# Patient Record
Sex: MALE | Race: BLACK OR AFRICAN AMERICAN | NOT HISPANIC OR LATINO | URBAN - METROPOLITAN AREA
[De-identification: names, ages, dates, MRNs, and addresses within clinical notes are randomized per-mention and may not be internally consistent; named-entity substitution may affect disease eponyms.]

---

## 2017-01-31 ENCOUNTER — INPATIENT (INPATIENT)
Facility: HOSPITAL | Age: 77
LOS: 5 days | Discharge: HOME HEALTH | End: 2017-02-06
Payer: MEDICARE

## 2017-01-31 VITALS
TEMPERATURE: 98 F | OXYGEN SATURATION: 98 % | RESPIRATION RATE: 20 BRPM | HEART RATE: 110 BPM | DIASTOLIC BLOOD PRESSURE: 77 MMHG | SYSTOLIC BLOOD PRESSURE: 123 MMHG

## 2017-01-31 LAB
BASOPHILS # BLD AUTO: 0.02 K/UL — SIGNIFICANT CHANGE UP (ref 0–0.2)
BASOPHILS NFR BLD AUTO: 0.2 % — SIGNIFICANT CHANGE UP (ref 0–2)
EOSINOPHIL # BLD AUTO: 0.08 K/UL — SIGNIFICANT CHANGE UP (ref 0–0.5)
EOSINOPHIL NFR BLD AUTO: 0.8 % — SIGNIFICANT CHANGE UP (ref 0–6)
HCT VFR BLD CALC: 37.4 % — LOW (ref 39–50)
HGB BLD-MCNC: 12.1 G/DL — LOW (ref 13–17)
IMM GRANULOCYTES NFR BLD AUTO: 0.5 % — SIGNIFICANT CHANGE UP (ref 0–1.5)
LYMPHOCYTES # BLD AUTO: 1.25 K/UL — SIGNIFICANT CHANGE UP (ref 1–3.3)
LYMPHOCYTES # BLD AUTO: 12.6 % — LOW (ref 13–44)
MCHC RBC-ENTMCNC: 27 PG — SIGNIFICANT CHANGE UP (ref 27–34)
MCHC RBC-ENTMCNC: 32.4 % — SIGNIFICANT CHANGE UP (ref 32–36)
MCV RBC AUTO: 83.5 FL — SIGNIFICANT CHANGE UP (ref 80–100)
MONOCYTES # BLD AUTO: 0.62 K/UL — SIGNIFICANT CHANGE UP (ref 0–0.9)
MONOCYTES NFR BLD AUTO: 6.2 % — SIGNIFICANT CHANGE UP (ref 2–14)
NEUTROPHILS # BLD AUTO: 7.93 K/UL — HIGH (ref 1.8–7.4)
NEUTROPHILS NFR BLD AUTO: 79.7 % — HIGH (ref 43–77)
PLATELET # BLD AUTO: 153 K/UL — SIGNIFICANT CHANGE UP (ref 150–400)
PMV BLD: 11.2 FL — SIGNIFICANT CHANGE UP (ref 7–13)
RBC # BLD: 4.48 M/UL — SIGNIFICANT CHANGE UP (ref 4.2–5.8)
RBC # FLD: 15.3 % — HIGH (ref 10.3–14.5)
WBC # BLD: 9.95 K/UL — SIGNIFICANT CHANGE UP (ref 3.8–10.5)
WBC # FLD AUTO: 9.95 K/UL — SIGNIFICANT CHANGE UP (ref 3.8–10.5)

## 2017-01-31 NOTE — ED ADULT TRIAGE NOTE - CHIEF COMPLAINT QUOTE
pt. BIBA from home, as per wife pt. was sitting in a chair, leaned back and his "eye rolled into the back of his head", pt. recalls feeling weak, dizzy and nauseous, has felt this way for about 2 weeks.  O2 sat 86% on RA upon EMS arrival, placed on non-rebreather, presently 98% of RA.  PMHx HTN, reyes catheter x 1 month.  . pt. BIBA from home, as per wife pt. was sitting in a chair, leaned back and his "eye rolled into the back of his head", pt. recalls feeling weak, dizzy and nauseous, has felt this way for about 2 weeks.  O2 sat 86% on RA upon EMS arrival, placed on non-rebreather, presently 98% of RA.  Denies CP/SOB at the present time.  PMHx HTN, reyes catheter x 1 month.  . pt. BIBA from home, as per wife pt. was sitting in a chair, leaned back and his "eyes rolled into the back of his head", pt. recalls feeling weak, dizzy and nauseous, has felt this way for about 2 weeks.  O2 sat 86% on RA upon EMS arrival, placed on non-rebreather, presently 98% on RA.  Denies CP/SOB at the present time.  PMHx HTN, reyes catheter x 1 month.  .

## 2017-01-31 NOTE — ED ADULT NURSE NOTE - OBJECTIVE STATEMENT
Received pt to spot 7 A&Ox4 reports one syncopal episode tonight at home while watching TV associated with generalized weakness and dizziness. Pt denies any CP or SOB, denies N/V associated. Pt has reyes leg bag in place r/t bladder weakness, placed 3 weeks ago. Pt reports hx of HTN only, O2 saturation on RA as documented, supplemental O2 applied with relief. EKG in progress, IV placed, labs sent, VS noted, MD at bedside, will reassess.

## 2017-01-31 NOTE — ED PROVIDER NOTE - CARE PLAN
Principal Discharge DX:	Sickle cell anemia with pain  Instructions for follow-up, activity and diet:	pls rest, drink plenty of fluids, continue taking your medications, f/u with your pmd, return for any worsening symptoms or any other concerning symptoms Principal Discharge DX:	Other acute pulmonary embolism with acute cor pulmonale

## 2017-01-31 NOTE — ED PROVIDER NOTE - PLAN OF CARE
pls rest, drink plenty of fluids, continue taking your medications, f/u with your pmd, return for any worsening symptoms or any other concerning symptoms

## 2017-01-31 NOTE — ED PROVIDER NOTE - PROGRESS NOTE DETAILS
MIKE BROWNING: Pt reevaluated at bedside, states that shes feeling a lot better, will be d/c and f/u with pmd ED Attending: Rey  Pt signed out to me from Dr. Parks to f/u and reassess.  Once pt condition reviewed we obtained CTPA which showed large BL PE's.  Given evidence of right heart strain on CT, as well as labs and persistent hypoxia we obtained MICU eval for tPA.  Recommendation from MICU to d/w CTSx as well for possible xfer to [facility] since no ICU beds at [facility] at present time and may be surgical candidate vs CTICU. I reviewed case by telephone with Dr. Spencer (CTSx) who stated that surgical intervention unlikely given extent of clot burden.  per [facility] MICU also no beds available at [facility].  We will keep pt at this hospital, administer tPA. Per MICU attending, policy guidance available for tPA in setting of Heparin gt and we are awaiting specific dosing/rate reccs.  Pt grossly unchanged, stil mildly tachypneic and becomes hypoxic with minimal exertion even while on 4 LPM NC. Rey: D/w MICU attending - PTT still pending and will likely need to hold Heparin gtt prior to tPA.  Pt admitted to MICU and will be boarding in CTICU.  Further Tx per MICU team who recommend continuing Heparin for now.

## 2017-01-31 NOTE — ED ADULT NURSE NOTE - CHIEF COMPLAINT QUOTE
pt. BIBA from home, as per wife pt. was sitting in a chair, leaned back and his "eye rolled into the back of his head", pt. recalls feeling weak, dizzy and nauseous, has felt this way for about 2 weeks.  O2 sat 86% on RA upon EMS arrival, placed on non-rebreather, presently 98% of RA.  Denies CP/SOB at the present time.  PMHx HTN, reyes catheter x 1 month.  .

## 2017-01-31 NOTE — ED PROVIDER NOTE - OBJECTIVE STATEMENT
75 y/o male pmh htn, indwelling reyes,  not on any blood thinners, no cardiologist presents s/p syncopal episode wife states that for few days he has been feeling weak, had few presyncopal episodes, however today, he was sitting in bed, wife walked in and saw his eyes roll back, and was unresponsive for 1 minute, called ems, o2 sat was 85% on RA, brought to ER. pt currently c/o weakness, denies any headache, neck pain, f/c/n/v/d, chest pain, sob, abdominal pain, urinary symptoms, numbnes/weakness/tingling, recent travel, sick contact, social history. Pt placed on canula 2l

## 2017-02-01 DIAGNOSIS — N40.0 BENIGN PROSTATIC HYPERPLASIA WITHOUT LOWER URINARY TRACT SYMPTOMS: ICD-10-CM

## 2017-02-01 DIAGNOSIS — D57.00 HB-SS DISEASE WITH CRISIS, UNSPECIFIED: ICD-10-CM

## 2017-02-01 DIAGNOSIS — I10 ESSENTIAL (PRIMARY) HYPERTENSION: ICD-10-CM

## 2017-02-01 DIAGNOSIS — I26.09 OTHER PULMONARY EMBOLISM WITH ACUTE COR PULMONALE: ICD-10-CM

## 2017-02-01 DIAGNOSIS — Z41.8 ENCOUNTER FOR OTHER PROCEDURES FOR PURPOSES OTHER THAN REMEDYING HEALTH STATE: ICD-10-CM

## 2017-02-01 DIAGNOSIS — I82.409 ACUTE EMBOLISM AND THROMBOSIS OF UNSPECIFIED DEEP VEINS OF UNSPECIFIED LOWER EXTREMITY: ICD-10-CM

## 2017-02-01 DIAGNOSIS — C61 MALIGNANT NEOPLASM OF PROSTATE: ICD-10-CM

## 2017-02-01 DIAGNOSIS — R55 SYNCOPE AND COLLAPSE: ICD-10-CM

## 2017-02-01 DIAGNOSIS — I26.99 OTHER PULMONARY EMBOLISM WITHOUT ACUTE COR PULMONALE: ICD-10-CM

## 2017-02-01 LAB
ALBUMIN SERPL ELPH-MCNC: 3.6 G/DL — SIGNIFICANT CHANGE UP (ref 3.3–5)
ALP SERPL-CCNC: 75 U/L — SIGNIFICANT CHANGE UP (ref 40–120)
ALT FLD-CCNC: 47 U/L — HIGH (ref 4–41)
APTT BLD: 185.4 SEC — CRITICAL HIGH (ref 27.5–37.4)
APTT BLD: 24 SEC — LOW (ref 27.5–37.4)
APTT BLD: 81.9 SEC — HIGH (ref 27.5–37.4)
APTT BLD: 91.3 SEC — HIGH (ref 27.5–37.4)
AST SERPL-CCNC: 42 U/L — HIGH (ref 4–40)
BASE EXCESS BLDV CALC-SCNC: -0.4 MMOL/L — SIGNIFICANT CHANGE UP
BASE EXCESS BLDV CALC-SCNC: -0.9 MMOL/L — SIGNIFICANT CHANGE UP
BILIRUB SERPL-MCNC: 0.9 MG/DL — SIGNIFICANT CHANGE UP (ref 0.2–1.2)
BLOOD GAS VENOUS - CREATININE: 1.27 MG/DL — SIGNIFICANT CHANGE UP (ref 0.5–1.3)
BLOOD GAS VENOUS - CREATININE: SIGNIFICANT CHANGE UP MG/DL (ref 0.5–1.3)
BUN SERPL-MCNC: 22 MG/DL — SIGNIFICANT CHANGE UP (ref 7–23)
CALCIUM SERPL-MCNC: 9.4 MG/DL — SIGNIFICANT CHANGE UP (ref 8.4–10.5)
CHLORIDE BLDV-SCNC: 108 MMOL/L — SIGNIFICANT CHANGE UP (ref 96–108)
CHLORIDE BLDV-SCNC: 109 MMOL/L — HIGH (ref 96–108)
CHLORIDE SERPL-SCNC: 102 MMOL/L — SIGNIFICANT CHANGE UP (ref 98–107)
CK MB BLD-MCNC: 3.61 NG/ML — SIGNIFICANT CHANGE UP (ref 1–6.6)
CK SERPL-CCNC: 89 U/L — SIGNIFICANT CHANGE UP (ref 30–200)
CO2 SERPL-SCNC: 18 MMOL/L — LOW (ref 22–31)
CREAT SERPL-MCNC: 1.41 MG/DL — HIGH (ref 0.5–1.3)
D DIMER BLD IA.RAPID-MCNC: 9205 NG/ML — SIGNIFICANT CHANGE UP
GAS PNL BLDV: 139 MMOL/L — SIGNIFICANT CHANGE UP (ref 136–146)
GAS PNL BLDV: 141 MMOL/L — SIGNIFICANT CHANGE UP (ref 136–146)
GLUCOSE BLDV-MCNC: 119 — HIGH (ref 70–99)
GLUCOSE BLDV-MCNC: 132 — HIGH (ref 70–99)
GLUCOSE SERPL-MCNC: 156 MG/DL — HIGH (ref 70–99)
HCO3 BLDV-SCNC: 22 MMOL/L — SIGNIFICANT CHANGE UP (ref 20–27)
HCO3 BLDV-SCNC: 24 MMOL/L — SIGNIFICANT CHANGE UP (ref 20–27)
HCT VFR BLD CALC: 33.5 % — LOW (ref 39–50)
HCT VFR BLDV CALC: 37.3 % — LOW (ref 39–51)
HCT VFR BLDV CALC: 41.4 % — SIGNIFICANT CHANGE UP (ref 39–51)
HGB BLD-MCNC: 11.1 G/DL — LOW (ref 13–17)
HGB BLDV-MCNC: 12.1 G/DL — LOW (ref 13–17)
HGB BLDV-MCNC: 13.5 G/DL — SIGNIFICANT CHANGE UP (ref 13–17)
INR BLD: 1.11 — SIGNIFICANT CHANGE UP (ref 0.87–1.18)
INR BLD: 1.19 — HIGH (ref 0.87–1.18)
LACTATE BLDV-MCNC: 2 MMOL/L — SIGNIFICANT CHANGE UP (ref 0.5–2)
LACTATE BLDV-MCNC: 2.5 MMOL/L — HIGH (ref 0.5–2)
MCHC RBC-ENTMCNC: 27.1 PG — SIGNIFICANT CHANGE UP (ref 27–34)
MCHC RBC-ENTMCNC: 33.1 % — SIGNIFICANT CHANGE UP (ref 32–36)
MCV RBC AUTO: 81.7 FL — SIGNIFICANT CHANGE UP (ref 80–100)
NT-PROBNP SERPL-SCNC: 2545 PG/ML — SIGNIFICANT CHANGE UP
NT-PROBNP SERPL-SCNC: 3070 PG/ML — SIGNIFICANT CHANGE UP
PCO2 BLDV: 41 MMHG — SIGNIFICANT CHANGE UP (ref 41–51)
PCO2 BLDV: 43 MMHG — SIGNIFICANT CHANGE UP (ref 41–51)
PH BLDV: 7.36 PH — SIGNIFICANT CHANGE UP (ref 7.32–7.43)
PH BLDV: 7.39 PH — SIGNIFICANT CHANGE UP (ref 7.32–7.43)
PLATELET # BLD AUTO: 137 K/UL — LOW (ref 150–400)
PMV BLD: 10.1 FL — SIGNIFICANT CHANGE UP (ref 7–13)
PO2 BLDV: 39 MMHG — SIGNIFICANT CHANGE UP (ref 35–40)
PO2 BLDV: < 24 MMHG — LOW (ref 35–40)
POTASSIUM BLDV-SCNC: 4.2 MMOL/L — SIGNIFICANT CHANGE UP (ref 3.4–4.5)
POTASSIUM BLDV-SCNC: 5 MMOL/L — HIGH (ref 3.4–4.5)
POTASSIUM SERPL-MCNC: 5 MMOL/L — SIGNIFICANT CHANGE UP (ref 3.5–5.3)
POTASSIUM SERPL-SCNC: 5 MMOL/L — SIGNIFICANT CHANGE UP (ref 3.5–5.3)
PROT SERPL-MCNC: 8 G/DL — SIGNIFICANT CHANGE UP (ref 6–8.3)
PROTHROM AB SERPL-ACNC: 12.7 SEC — SIGNIFICANT CHANGE UP (ref 10–13.1)
PROTHROM AB SERPL-ACNC: 13.6 SEC — HIGH (ref 10–13.1)
RBC # BLD: 4.1 M/UL — LOW (ref 4.2–5.8)
RBC # FLD: 15.2 % — HIGH (ref 10.3–14.5)
SAO2 % BLDV: 17.4 % — LOW (ref 60–85)
SAO2 % BLDV: 68.3 % — SIGNIFICANT CHANGE UP (ref 60–85)
SODIUM SERPL-SCNC: 142 MMOL/L — SIGNIFICANT CHANGE UP (ref 135–145)
TROPONIN T SERPL-MCNC: 0.08 NG/ML — HIGH (ref 0–0.06)
TROPONIN T SERPL-MCNC: 0.09 NG/ML — HIGH (ref 0–0.06)
WBC # BLD: 6.94 K/UL — SIGNIFICANT CHANGE UP (ref 3.8–10.5)
WBC # FLD AUTO: 6.94 K/UL — SIGNIFICANT CHANGE UP (ref 3.8–10.5)

## 2017-02-01 PROCEDURE — 71275 CT ANGIOGRAPHY CHEST: CPT | Mod: 26

## 2017-02-01 PROCEDURE — 70450 CT HEAD/BRAIN W/O DYE: CPT | Mod: 26

## 2017-02-01 PROCEDURE — 71010: CPT | Mod: 26

## 2017-02-01 RX ORDER — SODIUM CHLORIDE 9 MG/ML
1000 INJECTION INTRAMUSCULAR; INTRAVENOUS; SUBCUTANEOUS ONCE
Qty: 0 | Refills: 0 | Status: COMPLETED | OUTPATIENT
Start: 2017-02-01 | End: 2017-02-01

## 2017-02-01 RX ORDER — HEPARIN SODIUM 5000 [USP'U]/ML
7000 INJECTION INTRAVENOUS; SUBCUTANEOUS ONCE
Qty: 0 | Refills: 0 | Status: COMPLETED | OUTPATIENT
Start: 2017-02-01 | End: 2017-02-01

## 2017-02-01 RX ORDER — ALTEPLASE 100 MG
10 KIT INTRAVENOUS ONCE
Qty: 0 | Refills: 0 | Status: COMPLETED | OUTPATIENT
Start: 2017-02-01 | End: 2017-02-01

## 2017-02-01 RX ORDER — ASPIRIN/CALCIUM CARB/MAGNESIUM 324 MG
325 TABLET ORAL ONCE
Qty: 0 | Refills: 0 | Status: COMPLETED | OUTPATIENT
Start: 2017-02-01 | End: 2017-02-01

## 2017-02-01 RX ORDER — HEPARIN SODIUM 5000 [USP'U]/ML
800 INJECTION INTRAVENOUS; SUBCUTANEOUS
Qty: 25000 | Refills: 0 | Status: DISCONTINUED | OUTPATIENT
Start: 2017-02-01 | End: 2017-02-02

## 2017-02-01 RX ORDER — HEPARIN SODIUM 5000 [USP'U]/ML
INJECTION INTRAVENOUS; SUBCUTANEOUS
Qty: 25000 | Refills: 0 | Status: DISCONTINUED | OUTPATIENT
Start: 2017-02-01 | End: 2017-02-01

## 2017-02-01 RX ORDER — ALTEPLASE 100 MG
40 KIT INTRAVENOUS ONCE
Qty: 0 | Refills: 0 | Status: COMPLETED | OUTPATIENT
Start: 2017-02-01 | End: 2017-02-01

## 2017-02-01 RX ORDER — INFLUENZA VIRUS VACCINE 15; 15; 15; 15 UG/.5ML; UG/.5ML; UG/.5ML; UG/.5ML
0.5 SUSPENSION INTRAMUSCULAR ONCE
Qty: 0 | Refills: 0 | Status: DISCONTINUED | OUTPATIENT
Start: 2017-02-01 | End: 2017-02-06

## 2017-02-01 RX ORDER — HEPARIN SODIUM 5000 [USP'U]/ML
7000 INJECTION INTRAVENOUS; SUBCUTANEOUS EVERY 6 HOURS
Qty: 0 | Refills: 0 | Status: DISCONTINUED | OUTPATIENT
Start: 2017-02-01 | End: 2017-02-01

## 2017-02-01 RX ORDER — HEPARIN SODIUM 5000 [USP'U]/ML
3500 INJECTION INTRAVENOUS; SUBCUTANEOUS EVERY 6 HOURS
Qty: 0 | Refills: 0 | Status: DISCONTINUED | OUTPATIENT
Start: 2017-02-01 | End: 2017-02-01

## 2017-02-01 RX ADMIN — HEPARIN SODIUM 7000 UNIT(S): 5000 INJECTION INTRAVENOUS; SUBCUTANEOUS at 02:31

## 2017-02-01 RX ADMIN — ALTEPLASE 20 MILLIGRAM(S): KIT at 14:37

## 2017-02-01 RX ADMIN — SODIUM CHLORIDE 1000 MILLILITER(S): 9 INJECTION INTRAMUSCULAR; INTRAVENOUS; SUBCUTANEOUS at 02:08

## 2017-02-01 RX ADMIN — HEPARIN SODIUM 1600 UNIT(S)/HR: 5000 INJECTION INTRAVENOUS; SUBCUTANEOUS at 02:31

## 2017-02-01 RX ADMIN — HEPARIN SODIUM 800 UNIT(S)/HR: 5000 INJECTION INTRAVENOUS; SUBCUTANEOUS at 14:37

## 2017-02-01 RX ADMIN — Medication 325 MILLIGRAM(S): at 01:09

## 2017-02-01 RX ADMIN — ALTEPLASE 300 MILLIGRAM(S): KIT at 14:36

## 2017-02-01 RX ADMIN — HEPARIN SODIUM 0 UNIT(S)/HR: 5000 INJECTION INTRAVENOUS; SUBCUTANEOUS at 08:00

## 2017-02-01 NOTE — H&P ADULT. - NEUROLOGICAL DETAILS
alert and oriented x 3/responds to pain cranial nerves intact/normal strength/responds to pain/responds to verbal commands/alert and oriented x 3

## 2017-02-01 NOTE — H&P ADULT. - RADIOLOGY RESULTS AND INTERPRETATION
CT Head revealed no acute intracranial bleeding, mass effect, or shift. Volume loss and chronic microvascular ischemic changes.  CT Angio revealed extensive bilateral pulmonary emboli w/extension into the lobar, segmental, and subsegmental pulmonary arteries. Findings are suggestive of resulting right heart strain. Patchy right basilar airspace opacity due to early pulmonary infarction versus dependent change. Small right pleural effusion.

## 2017-02-01 NOTE — H&P ADULT. - PROBLEM SELECTOR PLAN 1
Patient hemodynamically stable, however with signs of cardiac strain on imaging and labs   CTA Chest and US show RHS, with enlarged RV  Elevated troponins and pro-BNP suggestive of cardiac strain   Will administer MOPET trial, with half dose TPA  Monitor closely with q1 hr neuro checks post TPA

## 2017-02-01 NOTE — H&P ADULT. - NEGATIVE CARDIOVASCULAR SYMPTOMS
no chest pain/no palpitations/no orthopnea no palpitations/no peripheral edema/no chest pain/no orthopnea

## 2017-02-01 NOTE — H&P ADULT. - RS GEN PE MLT RESP DETAILS PC
airway patent/breath sounds equal/no chest wall tenderness/respirations non-labored/clear to auscultation bilaterally no chest wall tenderness/breath sounds equal/clear to auscultation bilaterally/airway patent

## 2017-02-01 NOTE — H&P ADULT. - PROBLEM SELECTOR PLAN 2
Likely due to major pulmonary embolism in the setting of hypoxia and right heart strain.  - treatment as above.

## 2017-02-01 NOTE — H&P ADULT. - ASSESSMENT
76M PMH HTN, Prostate Ca (s/p chemo), BPH w/indwelling reyes, arrived with recurrent syncopal episodes, and found to be hypoxic with CTA chest showing PE with right heart strain. Patient is hemodynamically stable with increased work of breathing on NC 75yo M w/ PMH HTN, Prostate Ca (s/p chemo), BPH w/indwelling reyes, arrived with recurrent syncopal episodes, and found to be hypoxic with CTA chest showing PE with right heart strain. Patient is hemodynamically stable with increased work of breathing on NC, and signs of cardiac strain on imaging and labs.

## 2017-02-02 LAB
ALBUMIN SERPL ELPH-MCNC: 3.5 G/DL — SIGNIFICANT CHANGE UP (ref 3.3–5)
ALP SERPL-CCNC: 68 U/L — SIGNIFICANT CHANGE UP (ref 40–120)
ALT FLD-CCNC: 40 U/L — SIGNIFICANT CHANGE UP (ref 4–41)
APTT BLD: 41.3 SEC — HIGH (ref 27.5–37.4)
APTT BLD: 45.9 SEC — HIGH (ref 27.5–37.4)
AST SERPL-CCNC: 26 U/L — SIGNIFICANT CHANGE UP (ref 4–40)
BASOPHILS # BLD AUTO: 0.01 K/UL — SIGNIFICANT CHANGE UP (ref 0–0.2)
BASOPHILS NFR BLD AUTO: 0.2 % — SIGNIFICANT CHANGE UP (ref 0–2)
BILIRUB SERPL-MCNC: 0.7 MG/DL — SIGNIFICANT CHANGE UP (ref 0.2–1.2)
BUN SERPL-MCNC: 14 MG/DL — SIGNIFICANT CHANGE UP (ref 7–23)
CALCIUM SERPL-MCNC: 8.9 MG/DL — SIGNIFICANT CHANGE UP (ref 8.4–10.5)
CHLORIDE SERPL-SCNC: 107 MMOL/L — SIGNIFICANT CHANGE UP (ref 98–107)
CO2 SERPL-SCNC: 20 MMOL/L — LOW (ref 22–31)
CREAT SERPL-MCNC: 0.96 MG/DL — SIGNIFICANT CHANGE UP (ref 0.5–1.3)
EOSINOPHIL # BLD AUTO: 0.13 K/UL — SIGNIFICANT CHANGE UP (ref 0–0.5)
EOSINOPHIL NFR BLD AUTO: 2.2 % — SIGNIFICANT CHANGE UP (ref 0–6)
GLUCOSE SERPL-MCNC: 90 MG/DL — SIGNIFICANT CHANGE UP (ref 70–99)
HCT VFR BLD CALC: 34.7 % — LOW (ref 39–50)
HCT VFR BLD CALC: 34.7 % — LOW (ref 39–50)
HGB BLD-MCNC: 11.5 G/DL — LOW (ref 13–17)
HGB BLD-MCNC: 11.5 G/DL — LOW (ref 13–17)
IMM GRANULOCYTES NFR BLD AUTO: 0.2 % — SIGNIFICANT CHANGE UP (ref 0–1.5)
INR BLD: 1.29 — HIGH (ref 0.87–1.18)
LYMPHOCYTES # BLD AUTO: 1.55 K/UL — SIGNIFICANT CHANGE UP (ref 1–3.3)
LYMPHOCYTES # BLD AUTO: 26.8 % — SIGNIFICANT CHANGE UP (ref 13–44)
MAGNESIUM SERPL-MCNC: 2 MG/DL — SIGNIFICANT CHANGE UP (ref 1.6–2.6)
MCHC RBC-ENTMCNC: 27.2 PG — SIGNIFICANT CHANGE UP (ref 27–34)
MCHC RBC-ENTMCNC: 27.2 PG — SIGNIFICANT CHANGE UP (ref 27–34)
MCHC RBC-ENTMCNC: 33.1 % — SIGNIFICANT CHANGE UP (ref 32–36)
MCHC RBC-ENTMCNC: 33.1 % — SIGNIFICANT CHANGE UP (ref 32–36)
MCV RBC AUTO: 82 FL — SIGNIFICANT CHANGE UP (ref 80–100)
MCV RBC AUTO: 82 FL — SIGNIFICANT CHANGE UP (ref 80–100)
MONOCYTES # BLD AUTO: 0.48 K/UL — SIGNIFICANT CHANGE UP (ref 0–0.9)
MONOCYTES NFR BLD AUTO: 8.3 % — SIGNIFICANT CHANGE UP (ref 2–14)
NEUTROPHILS # BLD AUTO: 3.61 K/UL — SIGNIFICANT CHANGE UP (ref 1.8–7.4)
NEUTROPHILS NFR BLD AUTO: 62.3 % — SIGNIFICANT CHANGE UP (ref 43–77)
NT-PROBNP SERPL-SCNC: 1594 PG/ML — SIGNIFICANT CHANGE UP
PHOSPHATE SERPL-MCNC: 2.6 MG/DL — SIGNIFICANT CHANGE UP (ref 2.5–4.5)
PLATELET # BLD AUTO: 139 K/UL — LOW (ref 150–400)
PLATELET # BLD AUTO: 139 K/UL — LOW (ref 150–400)
PMV BLD: 10.1 FL — SIGNIFICANT CHANGE UP (ref 7–13)
PMV BLD: 10.1 FL — SIGNIFICANT CHANGE UP (ref 7–13)
POTASSIUM SERPL-MCNC: 3.9 MMOL/L — SIGNIFICANT CHANGE UP (ref 3.5–5.3)
POTASSIUM SERPL-SCNC: 3.9 MMOL/L — SIGNIFICANT CHANGE UP (ref 3.5–5.3)
PROT SERPL-MCNC: 7.2 G/DL — SIGNIFICANT CHANGE UP (ref 6–8.3)
PROTHROM AB SERPL-ACNC: 14.7 SEC — HIGH (ref 10–13.1)
RBC # BLD: 4.23 M/UL — SIGNIFICANT CHANGE UP (ref 4.2–5.8)
RBC # BLD: 4.23 M/UL — SIGNIFICANT CHANGE UP (ref 4.2–5.8)
RBC # FLD: 15.3 % — HIGH (ref 10.3–14.5)
RBC # FLD: 15.3 % — HIGH (ref 10.3–14.5)
SODIUM SERPL-SCNC: 144 MMOL/L — SIGNIFICANT CHANGE UP (ref 135–145)
TROPONIN T SERPL-MCNC: 0.07 NG/ML — HIGH (ref 0–0.06)
WBC # BLD: 5.79 K/UL — SIGNIFICANT CHANGE UP (ref 3.8–10.5)
WBC # BLD: 5.79 K/UL — SIGNIFICANT CHANGE UP (ref 3.8–10.5)
WBC # FLD AUTO: 5.79 K/UL — SIGNIFICANT CHANGE UP (ref 3.8–10.5)
WBC # FLD AUTO: 5.79 K/UL — SIGNIFICANT CHANGE UP (ref 3.8–10.5)

## 2017-02-02 RX ORDER — ENOXAPARIN SODIUM 100 MG/ML
80 INJECTION SUBCUTANEOUS
Qty: 0 | Refills: 0 | Status: DISCONTINUED | OUTPATIENT
Start: 2017-02-02 | End: 2017-02-06

## 2017-02-02 RX ADMIN — ENOXAPARIN SODIUM 80 MILLIGRAM(S): 100 INJECTION SUBCUTANEOUS at 15:45

## 2017-02-02 RX ADMIN — HEPARIN SODIUM 1000 UNIT(S)/HR: 5000 INJECTION INTRAVENOUS; SUBCUTANEOUS at 04:54

## 2017-02-02 RX ADMIN — HEPARIN SODIUM 1200 UNIT(S)/HR: 5000 INJECTION INTRAVENOUS; SUBCUTANEOUS at 12:07

## 2017-02-03 LAB
APTT BLD: 29.7 SEC — SIGNIFICANT CHANGE UP (ref 27.5–37.4)
AT III ACT/NOR PPP CHRO: 70 % — LOW (ref 76–140)
B2 MICROGLOB SERPL-MCNC: 2.8 MG/L — HIGH (ref 0.8–2.2)
BUN SERPL-MCNC: 12 MG/DL — SIGNIFICANT CHANGE UP (ref 7–23)
CALCIUM SERPL-MCNC: 9.1 MG/DL — SIGNIFICANT CHANGE UP (ref 8.4–10.5)
CHLORIDE SERPL-SCNC: 105 MMOL/L — SIGNIFICANT CHANGE UP (ref 98–107)
CO2 SERPL-SCNC: 21 MMOL/L — LOW (ref 22–31)
CREAT SERPL-MCNC: 1.05 MG/DL — SIGNIFICANT CHANGE UP (ref 0.5–1.3)
DRVVT CONFIRM: 29 SEC — SIGNIFICANT CHANGE UP (ref 27–41)
DRVVT INTERPRETATION.: NEGATIVE — SIGNIFICANT CHANGE UP
DRVVT SCREEN TO CONFIRM RATIO: 1.08 — SIGNIFICANT CHANGE UP (ref 0–1.2)
FERRITIN SERPL-MCNC: 578.7 NG/ML — HIGH (ref 30–400)
FOLATE SERPL-MCNC: 7 NG/ML — SIGNIFICANT CHANGE UP (ref 4.7–20)
GAS PNL BLDMV: SIGNIFICANT CHANGE UP
GLUCOSE SERPL-MCNC: 91 MG/DL — SIGNIFICANT CHANGE UP (ref 70–99)
HAPTOGLOB SERPL-MCNC: 245 MG/DL — HIGH (ref 34–200)
HCYS SERPL-MCNC: 10.8 UMOL/L — SIGNIFICANT CHANGE UP (ref 5–20)
INR BLD: 1.24 — HIGH (ref 0.87–1.18)
IRON SATN MFR SERPL: 263 UG/DL — SIGNIFICANT CHANGE UP (ref 155–535)
IRON SATN MFR SERPL: 48 UG/DL — SIGNIFICANT CHANGE UP (ref 45–165)
LDH SERPL L TO P-CCNC: 190 U/L — SIGNIFICANT CHANGE UP (ref 135–225)
MAGNESIUM SERPL-MCNC: 1.9 MG/DL — SIGNIFICANT CHANGE UP (ref 1.6–2.6)
NORMALIZED SCT PPP-RTO: 0.79 — LOW (ref 0.81–1.17)
NORMALIZED SCT PPP-RTO: 41.6 SEC — SIGNIFICANT CHANGE UP (ref 33.7–49.5)
NORMALIZED SCT PPP-RTO: NEGATIVE — SIGNIFICANT CHANGE UP
PHOSPHATE SERPL-MCNC: 3.5 MG/DL — SIGNIFICANT CHANGE UP (ref 2.5–4.5)
POTASSIUM SERPL-MCNC: 3.6 MMOL/L — SIGNIFICANT CHANGE UP (ref 3.5–5.3)
POTASSIUM SERPL-SCNC: 3.6 MMOL/L — SIGNIFICANT CHANGE UP (ref 3.5–5.3)
PROT C ACT/NOR PPP: 82 % — SIGNIFICANT CHANGE UP (ref 74–150)
PROTHROM AB SERPL-ACNC: 14.2 SEC — HIGH (ref 10–13.1)
PSA FLD-MCNC: 2.04 NG/ML — SIGNIFICANT CHANGE UP (ref 0–4)
SODIUM SERPL-SCNC: 140 MMOL/L — SIGNIFICANT CHANGE UP (ref 135–145)
UIBC SERPL-MCNC: 215 UG/DL — SIGNIFICANT CHANGE UP (ref 110–370)
VIT B12 SERPL-MCNC: 285 PG/ML — SIGNIFICANT CHANGE UP (ref 200–900)

## 2017-02-03 PROCEDURE — 86334 IMMUNOFIX E-PHORESIS SERUM: CPT | Mod: 26

## 2017-02-03 PROCEDURE — 74176 CT ABD & PELVIS W/O CONTRAST: CPT | Mod: 26

## 2017-02-03 PROCEDURE — G0452: CPT | Mod: 26

## 2017-02-03 RX ORDER — AMLODIPINE BESYLATE 2.5 MG/1
5 TABLET ORAL ONCE
Qty: 0 | Refills: 0 | Status: COMPLETED | OUTPATIENT
Start: 2017-02-03 | End: 2017-02-03

## 2017-02-03 RX ORDER — ENOXAPARIN SODIUM 100 MG/ML
80 INJECTION SUBCUTANEOUS
Qty: 60 | Refills: 0 | OUTPATIENT
Start: 2017-02-03 | End: 2017-03-05

## 2017-02-03 RX ADMIN — AMLODIPINE BESYLATE 5 MILLIGRAM(S): 2.5 TABLET ORAL at 19:55

## 2017-02-03 RX ADMIN — ENOXAPARIN SODIUM 80 MILLIGRAM(S): 100 INJECTION SUBCUTANEOUS at 06:31

## 2017-02-03 RX ADMIN — ENOXAPARIN SODIUM 80 MILLIGRAM(S): 100 INJECTION SUBCUTANEOUS at 18:21

## 2017-02-03 NOTE — DISCHARGE NOTE ADULT - PLAN OF CARE
treat with anticoagulation continue with lovenox as prescribed continue current management low salt diet  continue home medications F/U with Dr. Phelps in 2 weeks

## 2017-02-03 NOTE — DISCHARGE NOTE ADULT - MEDICATION SUMMARY - MEDICATIONS TO TAKE
I will START or STAY ON the medications listed below when I get home from the hospital:    losartan 100 mg oral tablet  -- 1 tab(s) by mouth once a day  -- Indication: For Hypertension    tamsulosin 0.4 mg oral capsule  -- 2 cap(s) by mouth once a day  -- Indication: For BPH (benign prostatic hyperplasia)    enoxaparin 120 mg/0.8 mL injectable solution  -- 120 milligram(s) injectable once a day  -- It is very important that you take or use this exactly as directed.  Do not skip doses or discontinue unless directed by your doctor.    -- Indication: For Other acute pulmonary embolism with acute cor pulmonale    amLODIPine 5 mg oral tablet  -- 1 tab(s) by mouth once a day  -- Indication: For Hypertension    ergocalciferol 50,000 intl units (1.25 mg) oral capsule  -- 1 cap(s) by mouth once a week  -- patient takes on Mondays  -- Indication: For Supplement

## 2017-02-03 NOTE — DISCHARGE NOTE ADULT - HOSPITAL COURSE
76M PMH HTN, prostate CA (s/p chemo), BPH w/indwelling reyes, who presents s/p recurrent syncopal episodes. Pt had reportedly been feeling weak for several days and had multiple episodes of syncope. Today, he was unresponsive in bed for 1 minute, found by wife who called EMS. When EMS arrived his SpO2 was 85% on RA. Patient denies headache, N/V, diarrhea, constipation, CP, SOB, abdominal pain, sick contact.  ED Vitals: T97.8, , /77, RR20, SpO2 83 on RA.  Initial labs demonstrated WBC 9.95, Hgb 12.1, Plt 153, Cr 1.41, Trop 0.08, Pro-BNP 2545, VBG pH 41, VBG pCO2 41, VBG HCO3 24.  CT Head revealed no acute intracranial bleeding, mass effect, or shift. Volume loss and chronic microvascular ischemic changes.  CT Angio revealed extensive bilateral pulmonary emboli w/extension into the lobar, segmental, and subsegmental pulmonary arteries and flattening of interventricular septum with reflex of contrast into IVC suggestive of resulting right heart strain. Patchy right basilar airspace opacity due to early pulmonary infarction versus dependent change. Small right pleural effusion.  Patient was given 1L NS bolus in the ED, , and started on IV heparin drip. MICU consulted for management of sub-massive PE with need for close monitoring post possible TPA.  The following was the hospital course:  s/p tpa, micu for monitoring, CT head negative,   2/2 Heme/Onc consult (Cherie): needs hypercaouagble w/u results may dictate length of treatment. Mild anemia, mild thrombocytopenia labs sent.  H/O prostate cancer check PSA and CT A/P.  2/2 Cardio: check Echo  2/3- CT A/ P - possible proctitis vs underdistension . no evidence of metastatic disease   02/02 Immunofixation- NO EVIDENCE OF MONOCLONAL COMPONENTS  2/5 TTE: 1. Normal mitral valve. Minimal mitral regurgitation.  2. Normal trileaflet aortic valve.  3. Increased relative wall thickness with normal left  ventricular mass index, consistent with concentric left  ventricular remodeling.  4. Normal left ventricular systolic function. No segmental  wall motion abnormalities.  5. Mild diastolic dysfunction (Stage I).  6. Normal right ventricular size and function.  *** No previous Echo exam.  2/5 Med: Increase norvasc to 10 mg, change to Lovenox 80 mg subq qd  2/5 Heme/Onc: PE s/p TPA, will need repeat thrombophilla testing, AC indefinitely, h/o Prostate cancer, PSA normal will monitor, F/u with Dr. Wheeler in 2 weeks

## 2017-02-03 NOTE — DISCHARGE NOTE ADULT - CARE PLAN
Principal Discharge DX:	Pulmonary thromboembolism  Goal:	treat with anticoagulation  Instructions for follow-up, activity and diet:	continue with lovenox as prescribed  Secondary Diagnosis:	BPH (benign prostatic hyperplasia)  Instructions for follow-up, activity and diet:	continue current management  Secondary Diagnosis:	Essential hypertension  Instructions for follow-up, activity and diet:	low salt diet  continue home medications  Secondary Diagnosis:	Prostate cancer  Instructions for follow-up, activity and diet:	F/U with Dr. Phelps in 2 weeks

## 2017-02-03 NOTE — DISCHARGE NOTE ADULT - CARE PROVIDER_API CALL
Michele Crespo), Hematology; Internal Medicine; Medical Oncology  [address]  [place] [address]  Phone: [phone]  Fax: [phone]    Sue Aldrich), Internal Medicine  [address] [place] [address]  Phone: [phone]  Fax: [phone]

## 2017-02-03 NOTE — DISCHARGE NOTE ADULT - PATIENT PORTAL LINK FT
“You can access the [facility] Patient Portal, offered by [facility], by registering with the following website: [email or web address]”

## 2017-02-04 LAB
BUN SERPL-MCNC: 11 MG/DL — SIGNIFICANT CHANGE UP (ref 7–23)
CALCIUM SERPL-MCNC: 8.8 MG/DL — SIGNIFICANT CHANGE UP (ref 8.4–10.5)
CHLORIDE SERPL-SCNC: 107 MMOL/L — SIGNIFICANT CHANGE UP (ref 98–107)
CO2 SERPL-SCNC: 22 MMOL/L — SIGNIFICANT CHANGE UP (ref 22–31)
CREAT SERPL-MCNC: 0.99 MG/DL — SIGNIFICANT CHANGE UP (ref 0.5–1.3)
GLUCOSE SERPL-MCNC: 97 MG/DL — SIGNIFICANT CHANGE UP (ref 70–99)
HCT VFR BLD CALC: 36.3 % — LOW (ref 39–50)
HCYS SERPL-MCNC: 10 UMOL/L — SIGNIFICANT CHANGE UP (ref 5–20)
HGB BLD-MCNC: 11.8 G/DL — LOW (ref 13–17)
MCHC RBC-ENTMCNC: 27.1 PG — SIGNIFICANT CHANGE UP (ref 27–34)
MCHC RBC-ENTMCNC: 32.5 % — SIGNIFICANT CHANGE UP (ref 32–36)
MCV RBC AUTO: 83.4 FL — SIGNIFICANT CHANGE UP (ref 80–100)
PLATELET # BLD AUTO: 196 K/UL — SIGNIFICANT CHANGE UP (ref 150–400)
PMV BLD: 10.9 FL — SIGNIFICANT CHANGE UP (ref 7–13)
POTASSIUM SERPL-MCNC: 3.8 MMOL/L — SIGNIFICANT CHANGE UP (ref 3.5–5.3)
POTASSIUM SERPL-SCNC: 3.8 MMOL/L — SIGNIFICANT CHANGE UP (ref 3.5–5.3)
RBC # BLD: 4.35 M/UL — SIGNIFICANT CHANGE UP (ref 4.2–5.8)
RBC # FLD: 15.4 % — HIGH (ref 10.3–14.5)
SODIUM SERPL-SCNC: 142 MMOL/L — SIGNIFICANT CHANGE UP (ref 135–145)
WBC # BLD: 4.98 K/UL — SIGNIFICANT CHANGE UP (ref 3.8–10.5)
WBC # FLD AUTO: 4.98 K/UL — SIGNIFICANT CHANGE UP (ref 3.8–10.5)

## 2017-02-04 RX ORDER — AMLODIPINE BESYLATE 2.5 MG/1
5 TABLET ORAL ONCE
Qty: 0 | Refills: 0 | Status: COMPLETED | OUTPATIENT
Start: 2017-02-04 | End: 2017-02-04

## 2017-02-04 RX ORDER — AMLODIPINE BESYLATE 2.5 MG/1
5 TABLET ORAL DAILY
Qty: 0 | Refills: 0 | Status: DISCONTINUED | OUTPATIENT
Start: 2017-02-04 | End: 2017-02-05

## 2017-02-04 RX ADMIN — ENOXAPARIN SODIUM 80 MILLIGRAM(S): 100 INJECTION SUBCUTANEOUS at 06:29

## 2017-02-04 RX ADMIN — AMLODIPINE BESYLATE 5 MILLIGRAM(S): 2.5 TABLET ORAL at 17:14

## 2017-02-04 RX ADMIN — ENOXAPARIN SODIUM 80 MILLIGRAM(S): 100 INJECTION SUBCUTANEOUS at 17:14

## 2017-02-05 LAB
BUN SERPL-MCNC: 10 MG/DL — SIGNIFICANT CHANGE UP (ref 7–23)
CALCIUM SERPL-MCNC: 8.8 MG/DL — SIGNIFICANT CHANGE UP (ref 8.4–10.5)
CHLORIDE SERPL-SCNC: 106 MMOL/L — SIGNIFICANT CHANGE UP (ref 98–107)
CO2 SERPL-SCNC: 21 MMOL/L — LOW (ref 22–31)
CREAT SERPL-MCNC: 0.95 MG/DL — SIGNIFICANT CHANGE UP (ref 0.5–1.3)
GLUCOSE SERPL-MCNC: 94 MG/DL — SIGNIFICANT CHANGE UP (ref 70–99)
HCT VFR BLD CALC: 37.2 % — LOW (ref 39–50)
HGB BLD-MCNC: 12 G/DL — LOW (ref 13–17)
MCHC RBC-ENTMCNC: 26.8 PG — LOW (ref 27–34)
MCHC RBC-ENTMCNC: 32.3 % — SIGNIFICANT CHANGE UP (ref 32–36)
MCV RBC AUTO: 83.2 FL — SIGNIFICANT CHANGE UP (ref 80–100)
PLATELET # BLD AUTO: 213 K/UL — SIGNIFICANT CHANGE UP (ref 150–400)
PMV BLD: 10.7 FL — SIGNIFICANT CHANGE UP (ref 7–13)
POTASSIUM SERPL-MCNC: 3.7 MMOL/L — SIGNIFICANT CHANGE UP (ref 3.5–5.3)
POTASSIUM SERPL-SCNC: 3.7 MMOL/L — SIGNIFICANT CHANGE UP (ref 3.5–5.3)
RBC # BLD: 4.47 M/UL — SIGNIFICANT CHANGE UP (ref 4.2–5.8)
RBC # FLD: 15.5 % — HIGH (ref 10.3–14.5)
SODIUM SERPL-SCNC: 140 MMOL/L — SIGNIFICANT CHANGE UP (ref 135–145)
WBC # BLD: 4.93 K/UL — SIGNIFICANT CHANGE UP (ref 3.8–10.5)
WBC # FLD AUTO: 4.93 K/UL — SIGNIFICANT CHANGE UP (ref 3.8–10.5)

## 2017-02-05 PROCEDURE — 93306 TTE W/DOPPLER COMPLETE: CPT | Mod: 26

## 2017-02-05 RX ORDER — AMLODIPINE BESYLATE 2.5 MG/1
10 TABLET ORAL DAILY
Qty: 0 | Refills: 0 | Status: DISCONTINUED | OUTPATIENT
Start: 2017-02-05 | End: 2017-02-06

## 2017-02-05 RX ADMIN — ENOXAPARIN SODIUM 80 MILLIGRAM(S): 100 INJECTION SUBCUTANEOUS at 17:32

## 2017-02-05 RX ADMIN — ENOXAPARIN SODIUM 80 MILLIGRAM(S): 100 INJECTION SUBCUTANEOUS at 05:45

## 2017-02-05 RX ADMIN — AMLODIPINE BESYLATE 5 MILLIGRAM(S): 2.5 TABLET ORAL at 05:45

## 2017-02-06 VITALS
SYSTOLIC BLOOD PRESSURE: 130 MMHG | HEART RATE: 65 BPM | OXYGEN SATURATION: 99 % | RESPIRATION RATE: 18 BRPM | DIASTOLIC BLOOD PRESSURE: 81 MMHG | TEMPERATURE: 98 F

## 2017-02-06 LAB
BUN SERPL-MCNC: 11 MG/DL — SIGNIFICANT CHANGE UP (ref 7–23)
CALCIUM SERPL-MCNC: 8.9 MG/DL — SIGNIFICANT CHANGE UP (ref 8.4–10.5)
CHLORIDE SERPL-SCNC: 104 MMOL/L — SIGNIFICANT CHANGE UP (ref 98–107)
CO2 SERPL-SCNC: 20 MMOL/L — LOW (ref 22–31)
CREAT SERPL-MCNC: 1.01 MG/DL — SIGNIFICANT CHANGE UP (ref 0.5–1.3)
GLUCOSE SERPL-MCNC: 132 MG/DL — HIGH (ref 70–99)
HCT VFR BLD CALC: 36.3 % — LOW (ref 39–50)
HGB BLD-MCNC: 11.6 G/DL — LOW (ref 13–17)
MCHC RBC-ENTMCNC: 26.7 PG — LOW (ref 27–34)
MCHC RBC-ENTMCNC: 32 % — SIGNIFICANT CHANGE UP (ref 32–36)
MCV RBC AUTO: 83.6 FL — SIGNIFICANT CHANGE UP (ref 80–100)
PLATELET # BLD AUTO: 232 K/UL — SIGNIFICANT CHANGE UP (ref 150–400)
PMV BLD: 10.9 FL — SIGNIFICANT CHANGE UP (ref 7–13)
POTASSIUM SERPL-MCNC: 3.7 MMOL/L — SIGNIFICANT CHANGE UP (ref 3.5–5.3)
POTASSIUM SERPL-SCNC: 3.7 MMOL/L — SIGNIFICANT CHANGE UP (ref 3.5–5.3)
RBC # BLD: 4.34 M/UL — SIGNIFICANT CHANGE UP (ref 4.2–5.8)
RBC # FLD: 15.4 % — HIGH (ref 10.3–14.5)
SODIUM SERPL-SCNC: 139 MMOL/L — SIGNIFICANT CHANGE UP (ref 135–145)
WBC # BLD: 4.9 K/UL — SIGNIFICANT CHANGE UP (ref 3.8–10.5)
WBC # FLD AUTO: 4.9 K/UL — SIGNIFICANT CHANGE UP (ref 3.8–10.5)

## 2017-02-06 RX ORDER — ENOXAPARIN SODIUM 100 MG/ML
120 INJECTION SUBCUTANEOUS
Qty: 30 | Refills: 0 | OUTPATIENT
Start: 2017-02-06 | End: 2017-03-08

## 2017-02-06 RX ORDER — ENOXAPARIN SODIUM 100 MG/ML
40 INJECTION SUBCUTANEOUS ONCE
Qty: 0 | Refills: 0 | Status: DISCONTINUED | OUTPATIENT
Start: 2017-02-06 | End: 2017-02-06

## 2017-02-06 RX ADMIN — AMLODIPINE BESYLATE 10 MILLIGRAM(S): 2.5 TABLET ORAL at 05:11

## 2017-02-06 RX ADMIN — ENOXAPARIN SODIUM 80 MILLIGRAM(S): 100 INJECTION SUBCUTANEOUS at 05:11

## 2017-02-07 LAB — B2 GLYCOPROT1 AB SER QL: NEGATIVE — SIGNIFICANT CHANGE UP

## 2017-02-08 LAB
CARDIOLIPIN IGA SER-MCNC: SIGNIFICANT CHANGE UP
CARDIOLIPIN IGM SER-MCNC: 12.59 MPL — HIGH (ref 0–11)
CARDIOLIPIN IGM SER-MCNC: 13.9 GPL — SIGNIFICANT CHANGE UP (ref 0–23)

## 2017-02-24 LAB — PROT S FREE PPP-ACNC: 78.9 % — SIGNIFICANT CHANGE UP (ref 70–130)

## 2017-02-27 LAB — METHYLMALONATE SERPL-SCNC: 0.28 — SIGNIFICANT CHANGE UP

## 2017-03-28 PROBLEM — I10 ESSENTIAL (PRIMARY) HYPERTENSION: Chronic | Status: ACTIVE | Noted: 2017-01-31

## 2017-04-03 ENCOUNTER — OUTPATIENT (OUTPATIENT)
Dept: OUTPATIENT SERVICES | Facility: HOSPITAL | Age: 77
LOS: 1 days | End: 2017-04-03
Payer: MEDICARE

## 2017-04-03 VITALS
HEART RATE: 70 BPM | SYSTOLIC BLOOD PRESSURE: 130 MMHG | OXYGEN SATURATION: 97 % | HEIGHT: 68 IN | TEMPERATURE: 98 F | DIASTOLIC BLOOD PRESSURE: 78 MMHG | RESPIRATION RATE: 16 BRPM | WEIGHT: 188.05 LBS

## 2017-04-03 DIAGNOSIS — I26.99 OTHER PULMONARY EMBOLISM WITHOUT ACUTE COR PULMONALE: ICD-10-CM

## 2017-04-03 DIAGNOSIS — D29.22 BENIGN NEOPLASM OF LEFT TESTIS: Chronic | ICD-10-CM

## 2017-04-03 DIAGNOSIS — R33.8 OTHER RETENTION OF URINE: ICD-10-CM

## 2017-04-03 DIAGNOSIS — C61 MALIGNANT NEOPLASM OF PROSTATE: ICD-10-CM

## 2017-04-03 DIAGNOSIS — Z01.818 ENCOUNTER FOR OTHER PREPROCEDURAL EXAMINATION: ICD-10-CM

## 2017-04-03 LAB
ANION GAP SERPL CALC-SCNC: 14 MMOL/L — SIGNIFICANT CHANGE UP (ref 5–17)
BLD GP AB SCN SERPL QL: NEGATIVE — SIGNIFICANT CHANGE UP
BUN SERPL-MCNC: 15 MG/DL — SIGNIFICANT CHANGE UP (ref 7–23)
CALCIUM SERPL-MCNC: 9.8 MG/DL — SIGNIFICANT CHANGE UP (ref 8.4–10.5)
CHLORIDE SERPL-SCNC: 99 MMOL/L — SIGNIFICANT CHANGE UP (ref 96–108)
CO2 SERPL-SCNC: 27 MMOL/L — SIGNIFICANT CHANGE UP (ref 22–31)
CREAT SERPL-MCNC: 0.96 MG/DL — SIGNIFICANT CHANGE UP (ref 0.5–1.3)
GLUCOSE SERPL-MCNC: 110 MG/DL — HIGH (ref 70–99)
HCT VFR BLD CALC: 41.6 % — SIGNIFICANT CHANGE UP (ref 39–50)
HGB BLD-MCNC: 13.1 G/DL — SIGNIFICANT CHANGE UP (ref 13–17)
MCHC RBC-ENTMCNC: 26.5 PG — LOW (ref 27–34)
MCHC RBC-ENTMCNC: 31.5 GM/DL — LOW (ref 32–36)
MCV RBC AUTO: 84.2 FL — SIGNIFICANT CHANGE UP (ref 80–100)
PLATELET # BLD AUTO: 240 K/UL — SIGNIFICANT CHANGE UP (ref 150–400)
POTASSIUM SERPL-MCNC: 4.8 MMOL/L — SIGNIFICANT CHANGE UP (ref 3.5–5.3)
POTASSIUM SERPL-SCNC: 4.8 MMOL/L — SIGNIFICANT CHANGE UP (ref 3.5–5.3)
RBC # BLD: 4.94 M/UL — SIGNIFICANT CHANGE UP (ref 4.2–5.8)
RBC # FLD: 15.9 % — HIGH (ref 10.3–14.5)
RH IG SCN BLD-IMP: NEGATIVE — SIGNIFICANT CHANGE UP
SODIUM SERPL-SCNC: 140 MMOL/L — SIGNIFICANT CHANGE UP (ref 135–145)
WBC # BLD: 4.46 K/UL — SIGNIFICANT CHANGE UP (ref 3.8–10.5)
WBC # FLD AUTO: 4.46 K/UL — SIGNIFICANT CHANGE UP (ref 3.8–10.5)

## 2017-04-03 PROCEDURE — 85027 COMPLETE CBC AUTOMATED: CPT

## 2017-04-03 PROCEDURE — 86901 BLOOD TYPING SEROLOGIC RH(D): CPT

## 2017-04-03 PROCEDURE — 80048 BASIC METABOLIC PNL TOTAL CA: CPT

## 2017-04-03 PROCEDURE — 86850 RBC ANTIBODY SCREEN: CPT

## 2017-04-03 PROCEDURE — G0463: CPT

## 2017-04-03 PROCEDURE — 86900 BLOOD TYPING SEROLOGIC ABO: CPT

## 2017-04-03 PROCEDURE — 87086 URINE CULTURE/COLONY COUNT: CPT

## 2017-04-03 NOTE — H&P PST ADULT - PMH
Hypertension    Prostate cancer  2010  s/p RT Hypertension    PE (pulmonary thromboembolism)  02/2017-EXTENSIVE BILATERAL PE  treated withTPA> HEPARIN> NOW ON LOVENOX 120 MG DAILY 6AM  Prostate cancer  2010  s/p RT

## 2017-04-03 NOTE — H&P PST ADULT - PROBLEM SELECTOR PLAN 1
Patient hemodynamically stable, however with signs of cardiac strain on imaging and labs   CTA Chest and US show RHS, with enlarged RV  Elevated troponins and pro-BNP suggestive of cardiac strain   Will administer MOPET trial, with half dose TPA  Monitor closely with q1 hr neuro checks post TPA cystoscopy   TURP.   sent

## 2017-04-03 NOTE — H&P PST ADULT - PROBLEM SELECTOR PLAN 2
Likely due to major pulmonary embolism in the setting of hypoxia and right heart strain.  - treatment as above. Instructed to continue meds &  last dose of Lovenox  6 am on 04/05/17.

## 2017-04-03 NOTE — H&P PST ADULT - HISTORY OF PRESENT ILLNESS
76M PMH HTN, prostate CA (s/p chemo), BPH w/indwelling reyes, who presents s/p recurrent syncopal episodes. Pt had reportedly been feeling weak for several days and had multiple episodes of syncope. Today, he was unresponsive in bed for 1 minute, found by wife who called EMS. When EMS arrived his SpO2 was 85% on RA. Patient denies headache, N/V, diarrhea, constipation, CP, SOB, abdominal pain, sick contact.    ED Vitals: T97.8, , /77, RR20, SpO2 83 on RA.  Initial labs demonstrated WBC 9.95, Hgb 12.1, Plt 153, Cr 1.41, Trop 0.08, Pro-BNP 2545, VBG pH 41, VBG pCO2 41, VBG HCO3 24.  CT Head revealed no acute intracranial bleeding, mass effect, or shift. Volume loss and chronic microvascular ischemic changes.  CT Angio revealed extensive bilateral pulmonary emboli w/extension into the lobar, segmental, and subsegmental pulmonary arteries and flattening of interventricular septum with reflex of contrast into IVC suggestive of resulting right heart strain. Patchy right basilar airspace opacity due to early pulmonary infarction versus dependent change. Small right pleural effusion.  Patient was given 1L NS bolus in the ED, , and started on IV heparin drip. MICU consulted for management of sub-massive PE with need for close monitoring post possible TPA. 76 male PMH of prostate cancer h/o chemo, BPH(indwelling reyes since 12/2016 on regular follow up),S/p PE 02/2017 s/p TPA>heparin> now on lovenox  , HTN , who presents with urinary retention with feeling of pressure s/p urology consult & scheduled for cystoscopy & TURP  on 04/06/17. 76 male PMH of prostate cancer h/o RT(2010), urinary retention( on indwelling Monroy since 12/2016 on regular urology follow up), HTN, S/p PE 02/2017 s/p TPA>heparin>  now on Lovenox  , who presents with urinary retention with feeling of pressure s/p urology consult & scheduled for cystoscopy & TURP  on 04/06/17

## 2017-04-03 NOTE — H&P PST ADULT - NSANTHOSAYNRD_GEN_A_CORE
No. KATIE screening performed.  STOP BANG Legend: 0-2 = LOW Risk; 3-4 = INTERMEDIATE Risk; 5-8 = HIGH Risk

## 2017-04-03 NOTE — H&P PST ADULT - NEUROLOGICAL DETAILS
deep reflexes intact/cranial nerves intact/sensation intact/responds to verbal commands/alert and oriented x 3/normal strength/responds to pain

## 2017-04-03 NOTE — H&P PST ADULT - RS GEN PE MLT RESP DETAILS PC
good air movement/no wheezes/breath sounds equal/no rales/no rhonchi/clear to auscultation bilaterally/no intercostal retractions

## 2017-04-04 LAB
CULTURE RESULTS: SIGNIFICANT CHANGE UP
SPECIMEN SOURCE: SIGNIFICANT CHANGE UP

## 2017-04-18 ENCOUNTER — APPOINTMENT (OUTPATIENT)
Dept: PULMONOLOGY | Facility: CLINIC | Age: 77
End: 2017-04-18

## 2017-04-18 VITALS
HEIGHT: 67 IN | BODY MASS INDEX: 29.51 KG/M2 | SYSTOLIC BLOOD PRESSURE: 148 MMHG | TEMPERATURE: 98.2 F | HEART RATE: 82 BPM | OXYGEN SATURATION: 99 % | RESPIRATION RATE: 15 BRPM | DIASTOLIC BLOOD PRESSURE: 79 MMHG | WEIGHT: 188 LBS

## 2017-04-18 LAB
BASE EXCESS BLDA CALC-SCNC: 0
BLOOD GAS COMMENTS ARTERIAL: NORMAL
GAS PNL BLDA: NORMAL
HCO3 BLDA-SCNC: 24.3
HOROWITZ INDEX BLDA+IHG-RTO: NORMAL
PCO2 BLDA: 40.9
PH BLDA: 7.39
PO2 BLDA: 73
SAO2 % BLDA: NORMAL

## 2017-04-20 ENCOUNTER — APPOINTMENT (OUTPATIENT)
Dept: CT IMAGING | Facility: IMAGING CENTER | Age: 77
End: 2017-04-20

## 2017-04-20 ENCOUNTER — OUTPATIENT (OUTPATIENT)
Dept: OUTPATIENT SERVICES | Facility: HOSPITAL | Age: 77
LOS: 1 days | End: 2017-04-20
Payer: MEDICARE

## 2017-04-20 DIAGNOSIS — D29.22 BENIGN NEOPLASM OF LEFT TESTIS: Chronic | ICD-10-CM

## 2017-04-20 DIAGNOSIS — C61 MALIGNANT NEOPLASM OF PROSTATE: ICD-10-CM

## 2017-04-20 PROCEDURE — 71275 CT ANGIOGRAPHY CHEST: CPT

## 2017-04-25 ENCOUNTER — APPOINTMENT (OUTPATIENT)
Dept: PULMONOLOGY | Facility: CLINIC | Age: 77
End: 2017-04-25

## 2017-04-25 VITALS
SYSTOLIC BLOOD PRESSURE: 151 MMHG | WEIGHT: 188 LBS | BODY MASS INDEX: 29.51 KG/M2 | OXYGEN SATURATION: 98 % | DIASTOLIC BLOOD PRESSURE: 84 MMHG | RESPIRATION RATE: 15 BRPM | TEMPERATURE: 98.5 F | HEIGHT: 67 IN | HEART RATE: 79 BPM

## 2017-04-25 LAB
BASE EXCESS BLDA CALC-SCNC: 0.4
BLOOD GAS COMMENTS ARTERIAL: NORMAL
GAS PNL BLDA: NORMAL
HCO3 BLDA-SCNC: 24.8
HOROWITZ INDEX BLDA+IHG-RTO: NORMAL
PCO2 BLDA: 41.9
PH BLDA: 7.39
PO2 BLDA: 76
SAO2 % BLDA: NORMAL

## 2017-04-25 RX ORDER — TAMSULOSIN HYDROCHLORIDE 0.4 MG/1
0.4 CAPSULE ORAL
Refills: 0 | Status: DISCONTINUED | COMMUNITY
End: 2017-04-25

## 2017-05-16 ENCOUNTER — OUTPATIENT (OUTPATIENT)
Dept: OUTPATIENT SERVICES | Facility: HOSPITAL | Age: 77
LOS: 1 days | End: 2017-05-16
Payer: MEDICARE

## 2017-05-16 ENCOUNTER — EMERGENCY (EMERGENCY)
Facility: HOSPITAL | Age: 77
LOS: 1 days | Discharge: HOME | End: 2017-05-16
Attending: EMERGENCY MEDICINE | Admitting: EMERGENCY MEDICINE
Payer: MEDICARE

## 2017-05-16 VITALS
TEMPERATURE: 98 F | WEIGHT: 194.01 LBS | DIASTOLIC BLOOD PRESSURE: 80 MMHG | HEIGHT: 68 IN | OXYGEN SATURATION: 98 % | SYSTOLIC BLOOD PRESSURE: 130 MMHG | HEART RATE: 86 BPM | RESPIRATION RATE: 16 BRPM

## 2017-05-16 VITALS
SYSTOLIC BLOOD PRESSURE: 154 MMHG | TEMPERATURE: 98 F | HEART RATE: 88 BPM | RESPIRATION RATE: 18 BRPM | OXYGEN SATURATION: 100 % | DIASTOLIC BLOOD PRESSURE: 80 MMHG

## 2017-05-16 VITALS
TEMPERATURE: 98 F | WEIGHT: 192.02 LBS | RESPIRATION RATE: 19 BRPM | DIASTOLIC BLOOD PRESSURE: 116 MMHG | SYSTOLIC BLOOD PRESSURE: 185 MMHG | OXYGEN SATURATION: 96 % | HEART RATE: 122 BPM | HEIGHT: 68 IN

## 2017-05-16 DIAGNOSIS — I10 ESSENTIAL (PRIMARY) HYPERTENSION: ICD-10-CM

## 2017-05-16 DIAGNOSIS — Z01.818 ENCOUNTER FOR OTHER PREPROCEDURAL EXAMINATION: ICD-10-CM

## 2017-05-16 DIAGNOSIS — D29.22 BENIGN NEOPLASM OF LEFT TESTIS: Chronic | ICD-10-CM

## 2017-05-16 DIAGNOSIS — R33.8 OTHER RETENTION OF URINE: ICD-10-CM

## 2017-05-16 LAB
ANION GAP SERPL CALC-SCNC: 15 MMOL/L — SIGNIFICANT CHANGE UP (ref 5–17)
APPEARANCE UR: CLEAR — SIGNIFICANT CHANGE UP
BILIRUB UR-MCNC: NEGATIVE — SIGNIFICANT CHANGE UP
BLD GP AB SCN SERPL QL: NEGATIVE — SIGNIFICANT CHANGE UP
BLOOD UR QL VISUAL: HIGH
BUN SERPL-MCNC: 21 MG/DL — SIGNIFICANT CHANGE UP (ref 7–23)
CALCIUM SERPL-MCNC: 9.5 MG/DL — SIGNIFICANT CHANGE UP (ref 8.4–10.5)
CHLORIDE SERPL-SCNC: 101 MMOL/L — SIGNIFICANT CHANGE UP (ref 96–108)
CO2 SERPL-SCNC: 24 MMOL/L — SIGNIFICANT CHANGE UP (ref 22–31)
COLOR SPEC: YELLOW — SIGNIFICANT CHANGE UP
CREAT SERPL-MCNC: 1.09 MG/DL — SIGNIFICANT CHANGE UP (ref 0.5–1.3)
GLUCOSE SERPL-MCNC: 95 MG/DL — SIGNIFICANT CHANGE UP (ref 70–99)
GLUCOSE UR-MCNC: NEGATIVE — SIGNIFICANT CHANGE UP
HCT VFR BLD CALC: 43.2 % — SIGNIFICANT CHANGE UP (ref 39–50)
HGB BLD-MCNC: 13.8 G/DL — SIGNIFICANT CHANGE UP (ref 13–17)
KETONES UR-MCNC: NEGATIVE — SIGNIFICANT CHANGE UP
LEUKOCYTE ESTERASE UR-ACNC: HIGH
MCHC RBC-ENTMCNC: 27 PG — SIGNIFICANT CHANGE UP (ref 27–34)
MCHC RBC-ENTMCNC: 31.9 GM/DL — LOW (ref 32–36)
MCV RBC AUTO: 84.4 FL — SIGNIFICANT CHANGE UP (ref 80–100)
MUCOUS THREADS # UR AUTO: SIGNIFICANT CHANGE UP
NITRITE UR-MCNC: NEGATIVE — SIGNIFICANT CHANGE UP
PH UR: 6.5 — SIGNIFICANT CHANGE UP (ref 4.6–8)
PLATELET # BLD AUTO: 212 K/UL — SIGNIFICANT CHANGE UP (ref 150–400)
POTASSIUM SERPL-MCNC: 4.5 MMOL/L — SIGNIFICANT CHANGE UP (ref 3.5–5.3)
POTASSIUM SERPL-SCNC: 4.5 MMOL/L — SIGNIFICANT CHANGE UP (ref 3.5–5.3)
PROT UR-MCNC: 100 — SIGNIFICANT CHANGE UP
RBC # BLD: 5.12 M/UL — SIGNIFICANT CHANGE UP (ref 4.2–5.8)
RBC # FLD: 15.3 % — HIGH (ref 10.3–14.5)
RBC CASTS # UR COMP ASSIST: SIGNIFICANT CHANGE UP (ref 0–?)
RH IG SCN BLD-IMP: NEGATIVE — SIGNIFICANT CHANGE UP
SODIUM SERPL-SCNC: 140 MMOL/L — SIGNIFICANT CHANGE UP (ref 135–145)
SP GR SPEC: 1.02 — SIGNIFICANT CHANGE UP (ref 1–1.03)
SQUAMOUS # UR AUTO: SIGNIFICANT CHANGE UP
UROBILINOGEN FLD QL: NORMAL E.U. — SIGNIFICANT CHANGE UP (ref 0.1–0.2)
WBC # BLD: 5.03 K/UL — SIGNIFICANT CHANGE UP (ref 3.8–10.5)
WBC # FLD AUTO: 5.03 K/UL — SIGNIFICANT CHANGE UP (ref 3.8–10.5)
WBC UR QL: SIGNIFICANT CHANGE UP (ref 0–?)

## 2017-05-16 PROCEDURE — 85027 COMPLETE CBC AUTOMATED: CPT

## 2017-05-16 PROCEDURE — 86900 BLOOD TYPING SEROLOGIC ABO: CPT

## 2017-05-16 PROCEDURE — G0463: CPT

## 2017-05-16 PROCEDURE — 80048 BASIC METABOLIC PNL TOTAL CA: CPT

## 2017-05-16 PROCEDURE — 86901 BLOOD TYPING SEROLOGIC RH(D): CPT

## 2017-05-16 PROCEDURE — 99284 EMERGENCY DEPT VISIT MOD MDM: CPT | Mod: GC

## 2017-05-16 PROCEDURE — 86850 RBC ANTIBODY SCREEN: CPT

## 2017-05-16 RX ORDER — TAMSULOSIN HYDROCHLORIDE 0.4 MG/1
2 CAPSULE ORAL
Qty: 0 | Refills: 0 | COMMUNITY

## 2017-05-16 RX ORDER — CIPROFLOXACIN LACTATE 400MG/40ML
500 VIAL (ML) INTRAVENOUS ONCE
Qty: 0 | Refills: 0 | Status: COMPLETED | OUTPATIENT
Start: 2017-05-16 | End: 2017-05-16

## 2017-05-16 RX ORDER — ERGOCALCIFEROL 1.25 MG/1
1 CAPSULE ORAL
Qty: 0 | Refills: 0 | COMMUNITY

## 2017-05-16 RX ADMIN — Medication 500 MILLIGRAM(S): at 23:37

## 2017-05-16 NOTE — ED ADULT TRIAGE NOTE - CHIEF COMPLAINT QUOTE
pt had a reyes placed last month for urinary retention, states he has had no output since 10am. pt writhing in pain.

## 2017-05-16 NOTE — ED ADULT NURSE NOTE - OBJECTIVE STATEMENT
Alert and oriented x 4. Pt received to spot 17 complaining of urinary retention since 3pm. Pt has pain 10/10 to lower abdominal area. Pt denies chest pain, shortness of breath, nausea, vomiting or dizziness. Reyes removed. New reyes in place. Pt states he has relief. No pain after new reyes. Will continue to monitor.  MD aware.

## 2017-05-16 NOTE — ED PROVIDER NOTE - PROGRESS NOTE DETAILS
Monroy replaced w/ 500 mL of urine output. Pt's discomfort relieved and patient no longer having any symptoms. U/A shows moderate LE, 25-50 WBC; will give cipro and d/c pt home w/ 1 week of ciprofloxacin.

## 2017-05-16 NOTE — H&P PST ADULT - GASTROINTESTINAL DETAILS
nontender/bowel sounds normal/soft/no distention bowel sounds normal/soft/nontender/no masses palpable

## 2017-05-16 NOTE — H&P PST ADULT - OTHER CARE PROVIDERS
DAVIS Camargo, pulmonologist [phone]; Dr. Sargent (vascular) appntmnt 5/25/17 for venous doppler pre-op

## 2017-05-16 NOTE — ED PROVIDER NOTE - OBJECTIVE STATEMENT
76 M w/ hx of prostate cancer, urinary retention (chronic reyes in place) who is coming in w/ suprapubic distension/pain and lack of urine in reyes for 7 hours. Pt said reyes output was normal until this morning. In the afternoon, the patient noticed that his reyes output was low and then urine output completely stopped. He started noticing some increased abdominal discomfort which continued to worsen until he came in. Otherwise, no recent changes in health status.

## 2017-05-16 NOTE — H&P PST ADULT - RS GEN PE MLT RESP DETAILS PC
breath sounds equal/no rales/no intercostal retractions/clear to auscultation bilaterally/good air movement/no rhonchi/no wheezes rhonchi/respirations non-labored

## 2017-05-16 NOTE — H&P PST ADULT - NEGATIVE CARDIOVASCULAR SYMPTOMS
no peripheral edema/no paroxysmal nocturnal dyspnea/no chest pain/no palpitations/no dyspnea on exertion/no orthopnea

## 2017-05-16 NOTE — H&P PST ADULT - NEUROLOGICAL DETAILS
cranial nerves intact/sensation intact/responds to pain/alert and oriented x 3/responds to verbal commands/normal strength/deep reflexes intact deep reflexes intact/sensation intact/alert and oriented x 3/responds to verbal commands/normal strength

## 2017-05-16 NOTE — H&P PST ADULT - MUSCULOSKELETAL
details… No joint pain, swelling or deformity; no limitation of movement normal strength/no calf tenderness detailed exam

## 2017-05-16 NOTE — H&P PST ADULT - PMH
Hypertension    PE (pulmonary thromboembolism)  02/2017-extensive bilateral PE's treated with TPA> Heparin and now on Lovenox 120 MG daily  Prostate cancer  2010 - s/p RT Hypertension    PE (pulmonary thromboembolism)  02/2017-extensive bilateral PE's treated with TPA> Heparin and now on Lovenox 120 MG daily  Prostate cancer  2010 - s/p RT  Urinary retention

## 2017-05-16 NOTE — ED PROVIDER NOTE - ATTENDING CONTRIBUTION TO CARE
I performed a face to face bedside interview with patient regarding history of present illness, review of symptoms and past medical history. I completed an independent physical exam.  I have discussed patient's plan of care.   I agree with note as stated above, having amended the EMR as needed to reflect my findings. I have discussed the assessment and plan of care.  This includes during the time I functioned as the attending physician for this patient.  Attending Contribution to Care: agree with plan of resident. pt stable, for d/c. reyes replaced successfully. mild uti on ua, neg nitrates. d/c with cipro, pending cultures.

## 2017-05-16 NOTE — H&P PST ADULT - PROBLEM SELECTOR PLAN 2
Instructed to continue meds &  last dose of Lovenox  6 am on 04/05/17. As per Dr. Camargo, pulmonologist, pt. will hold Lovenox 7 days pre-op, last dose 7/22/17  Pt. will have doppler of lower extremities done at Dr. Sargent's office on 5/25/17 pre-op - will request results As per Dr. Camargo, pulmonologist, pt. will hold Lovenox 7 days pre-op, last dose 5/22/17  Pt. will have doppler of lower extremities done at Dr. Sargent's office on 5/25/17 pre-op - will request results As per Dr. Camargo, pulmonologist, pt. will can hold Lovenox 24 hours pre-op  Pt. will have doppler of lower extremities done at Dr. Sargent's office on 5/25/17 pre-op - will request results

## 2017-05-16 NOTE — ED PROVIDER NOTE - MEDICAL DECISION MAKING DETAILS
76 M w/ chronic reyes p/w abdominal distension due to clogged reyes. Will change reyes; if pain improves, will check U/A; no labs.

## 2017-05-16 NOTE — ED PROVIDER NOTE - PMH
Hypertension    PE (pulmonary thromboembolism)  02/2017-extensive bilateral PE's treated with TPA> Heparin and now on Lovenox 120 MG daily  Prostate cancer  2010 - s/p RT  Urinary retention

## 2017-05-16 NOTE — H&P PST ADULT - HISTORY OF PRESENT ILLNESS
76 male PMH of prostate cancer h/o RT(2010), urinary retention( on indwelling Monroy since 12/2016, changed everymonth thereafter  on regular urology follow up), HTN, S/p PE 02/2017 s/p TPA>heparin>  now on Lovenox  , who presents with urinary retention with feeling of pressure s/p urology consult & scheduled for cystoscopy & TURP  on 04/06/17 75 yo black male, PMH prostate CA s/p RT 2010, pt. with urinary retention in December 2016, reyes placed and has been changed every month by urology, TURB delayed due to PE in 2/2017 s/p TPA, heparin and presently Lovenox injections, procedure had been scheduled for 4/6/17, but could not get pulmonologist evaluation in time, now presents to PST today for Cystoscopy, TURP on 5/20/17. Pt. saw pulmonologist, Dr. Camargo on 4/25/17 and will hold Lovenox 7 days pre-op, last dose 4/22/17. pt. denies SOB, chest pain, fever, chills, pain, changes in bowel habits, Reyes draining well. 75 yo black male, PMH prostate CA s/p RT 2010, pt. with urinary retention in December 2016, reyes placed and has been changed every month by urology, TURB delayed due to PE in 2/2017 s/p TPA, heparin and presently Lovenox injections, procedure had been scheduled for 4/6/17, but could not get pulmonologist evaluation in time, now presents to PST today for Cystoscopy, TURP on 5/20/17. Pt. saw pulmonologist, Dr. Camargo on 4/25/17 and will hold Lovenox 7 days pre-op, last dose 4/22/17. pt. denies SOB, chest pain, fever, chills, pain, changes in bowel habits, Reyes draining well. Pt. to provide urine, even after clamping Reyes for one hour. Pt. will hydrate himself today and tomorrow morning, and will return tomorrow 5/17/17 for urine C&S. 75 yo black male, PMH prostate CA s/p RT 2010, pt. with urinary retention in December 2016, reyes placed and has been changed every month by urology, TURB delayed due to PE in 2/2017 s/p TPA, heparin and presently Lovenox injections, procedure had been scheduled for 4/6/17, but could not get pulmonologist evaluation in time, now presents to PST today for Cystoscopy, TURP on 5/20/17. Pt. saw pulmonologist, Dr. Camarog on 4/25/17 and will hold Lovenox 7 days pre-op, last dose 5/22/17. pt. denies SOB, chest pain, fever, chills, pain, changes in bowel habits, Reyes draining well. Pt. to provide urine, even after clamping Reyes for one hour. Pt. will hydrate himself today and tomorrow morning, and will return tomorrow 5/17/17 for urine C&S. 77 yo black male, PMH prostate CA s/p RT 2010, pt. with urinary retention in December 2016, reyes placed and has been changed every month by urology, TURB delayed due to PE in 2/2017 s/p TPA, heparin and presently Lovenox injections, procedure had been scheduled for 4/6/17, but could not get pulmonologist evaluation in time, now presents to PST today for Cystoscopy, TURP on 5/20/17. Pt. saw pulmonologist, Dr. Camargo on 4/25/17 and will hold Lovenox 7 days pre-op, last dose 5/22/17. pt. denies SOB, chest pain, fever, chills, pain, changes in bowel habits, Reyes draining well. Pt. to provide urine, even after clamping Reyes for one hour. Pt. will hydrate himself today and tomorrow morning, and will return tomorrow 5/17/17 for urine C&S.  5/17/17Addendum: Pt. called today, he went to [facility] ER last night with urinary retention with clogged Reyes, UA and Urine C&S done, pt. placed on antibiotics (Cipro) PO. Pt. home now and comfortable. 75 yo black male, PMH prostate CA s/p RT 2010, pt. with urinary retention in December 2016, reyes placed and has been changed every month by urology, TURB delayed due to PE in 2/2017 s/p TPA, heparin and presently Lovenox injections, procedure had been scheduled for 4/6/17, but could not get pulmonologist evaluation in time, now presents to PST today for Cystoscopy, TURP on 5/20/17. Pt. saw pulmonologist, Dr. Camargo on 4/25/17 and will hold Lovenox 24 hours pre-op (5/29/17) Pt. denies SOB, chest pain, fever, chills, pain, changes in bowel habits, Reyes draining well. Pt. was unable to provide urine, even after clamping Reyes for one hour. Pt. will hydrate himself today and tomorrow morning, and will return tomorrow 5/17/17 for urine C&S.    5/17/17Addendum: Pt. called today, he went to [facility] ER last night with urinary retention with clogged Reyes, UA and Urine C&S done, pt. placed on antibiotics (Cipro) PO. Pt. home now and comfortable.

## 2017-05-17 RX ORDER — MOXIFLOXACIN HYDROCHLORIDE TABLETS, 400 MG 400 MG/1
1 TABLET, FILM COATED ORAL
Qty: 14 | Refills: 0 | OUTPATIENT
Start: 2017-05-17 | End: 2017-05-24

## 2017-05-18 LAB
BACTERIA UR CULT: SIGNIFICANT CHANGE UP
SPECIMEN SOURCE: SIGNIFICANT CHANGE UP

## 2017-05-19 RX ORDER — CEFPODOXIME PROXETIL 100 MG
1 TABLET ORAL
Qty: 14 | Refills: 0 | OUTPATIENT
Start: 2017-05-19 | End: 2017-05-26

## 2017-05-19 NOTE — ED POST DISCHARGE NOTE - OTHER COMMUNICATION
Spoke with pt [phone]. pts symptoms improved but will changed abx to cefpodoxime. Pt will schedule appt with his PCP/urologist for follow up. Discussed with patient need to return to ED if symptoms don't continue to improve or recur or develops any new or worsening symptoms that are of concern.

## 2017-05-24 ENCOUNTER — APPOINTMENT (OUTPATIENT)
Dept: VASCULAR SURGERY | Facility: CLINIC | Age: 77
End: 2017-05-24

## 2017-05-24 VITALS
WEIGHT: 188 LBS | BODY MASS INDEX: 29.51 KG/M2 | HEIGHT: 67 IN | DIASTOLIC BLOOD PRESSURE: 83 MMHG | TEMPERATURE: 98.2 F | SYSTOLIC BLOOD PRESSURE: 144 MMHG | HEART RATE: 94 BPM

## 2017-05-25 ENCOUNTER — APPOINTMENT (OUTPATIENT)
Dept: PULMONOLOGY | Facility: CLINIC | Age: 77
End: 2017-05-25

## 2017-05-25 VITALS
HEART RATE: 79 BPM | TEMPERATURE: 98.1 F | SYSTOLIC BLOOD PRESSURE: 135 MMHG | RESPIRATION RATE: 15 BRPM | HEIGHT: 67 IN | BODY MASS INDEX: 30.29 KG/M2 | WEIGHT: 193 LBS | OXYGEN SATURATION: 98 % | DIASTOLIC BLOOD PRESSURE: 77 MMHG

## 2017-05-26 ENCOUNTER — OUTPATIENT (OUTPATIENT)
Dept: OUTPATIENT SERVICES | Facility: HOSPITAL | Age: 77
LOS: 1 days | End: 2017-05-26
Payer: MEDICARE

## 2017-05-26 ENCOUNTER — APPOINTMENT (OUTPATIENT)
Dept: CT IMAGING | Facility: IMAGING CENTER | Age: 77
End: 2017-05-26

## 2017-05-26 DIAGNOSIS — D29.22 BENIGN NEOPLASM OF LEFT TESTIS: Chronic | ICD-10-CM

## 2017-05-26 DIAGNOSIS — Z00.8 ENCOUNTER FOR OTHER GENERAL EXAMINATION: ICD-10-CM

## 2017-05-26 PROCEDURE — 74174 CTA ABD&PLVS W/CONTRAST: CPT

## 2017-05-26 PROCEDURE — 82565 ASSAY OF CREATININE: CPT

## 2017-06-05 ENCOUNTER — OUTPATIENT (OUTPATIENT)
Dept: OUTPATIENT SERVICES | Facility: HOSPITAL | Age: 77
LOS: 1 days | End: 2017-06-05
Payer: MEDICARE

## 2017-06-05 ENCOUNTER — APPOINTMENT (OUTPATIENT)
Dept: VASCULAR SURGERY | Facility: HOSPITAL | Age: 77
End: 2017-06-05

## 2017-06-05 VITALS
SYSTOLIC BLOOD PRESSURE: 146 MMHG | TEMPERATURE: 98 F | DIASTOLIC BLOOD PRESSURE: 76 MMHG | HEART RATE: 84 BPM | OXYGEN SATURATION: 97 % | WEIGHT: 197.09 LBS | HEIGHT: 68 IN | RESPIRATION RATE: 16 BRPM

## 2017-06-05 DIAGNOSIS — I82.409 ACUTE EMBOLISM AND THROMBOSIS OF UNSPECIFIED DEEP VEINS OF UNSPECIFIED LOWER EXTREMITY: ICD-10-CM

## 2017-06-05 DIAGNOSIS — D29.22 BENIGN NEOPLASM OF LEFT TESTIS: Chronic | ICD-10-CM

## 2017-06-05 LAB
ALBUMIN SERPL ELPH-MCNC: 4.3 G/DL — SIGNIFICANT CHANGE UP (ref 3.3–5)
ALP SERPL-CCNC: 62 U/L — SIGNIFICANT CHANGE UP (ref 40–120)
ALT FLD-CCNC: 18 U/L RC — SIGNIFICANT CHANGE UP (ref 10–45)
ANION GAP SERPL CALC-SCNC: 13 MMOL/L — SIGNIFICANT CHANGE UP (ref 5–17)
APTT BLD: 31 SEC — SIGNIFICANT CHANGE UP (ref 27.5–37.4)
AST SERPL-CCNC: 27 U/L — SIGNIFICANT CHANGE UP (ref 10–40)
BILIRUB SERPL-MCNC: 0.3 MG/DL — SIGNIFICANT CHANGE UP (ref 0.2–1.2)
BUN SERPL-MCNC: 17 MG/DL — SIGNIFICANT CHANGE UP (ref 7–23)
CALCIUM SERPL-MCNC: 9.6 MG/DL — SIGNIFICANT CHANGE UP (ref 8.4–10.5)
CHLORIDE SERPL-SCNC: 104 MMOL/L — SIGNIFICANT CHANGE UP (ref 96–108)
CO2 SERPL-SCNC: 23 MMOL/L — SIGNIFICANT CHANGE UP (ref 22–31)
CREAT SERPL-MCNC: 1.16 MG/DL — SIGNIFICANT CHANGE UP (ref 0.5–1.3)
GLUCOSE SERPL-MCNC: 112 MG/DL — HIGH (ref 70–99)
HCT VFR BLD CALC: 44.4 % — SIGNIFICANT CHANGE UP (ref 39–50)
HGB BLD-MCNC: 13.7 G/DL — SIGNIFICANT CHANGE UP (ref 13–17)
INR BLD: 1.12 RATIO — SIGNIFICANT CHANGE UP (ref 0.88–1.16)
MCHC RBC-ENTMCNC: 26.7 PG — LOW (ref 27–34)
MCHC RBC-ENTMCNC: 30.8 GM/DL — LOW (ref 32–36)
MCV RBC AUTO: 86.8 FL — SIGNIFICANT CHANGE UP (ref 80–100)
PLATELET # BLD AUTO: 203 K/UL — SIGNIFICANT CHANGE UP (ref 150–400)
POTASSIUM SERPL-MCNC: 5.3 MMOL/L — SIGNIFICANT CHANGE UP (ref 3.5–5.3)
POTASSIUM SERPL-SCNC: 5.3 MMOL/L — SIGNIFICANT CHANGE UP (ref 3.5–5.3)
PROT SERPL-MCNC: 8.3 G/DL — SIGNIFICANT CHANGE UP (ref 6–8.3)
PROTHROM AB SERPL-ACNC: 12.2 SEC — SIGNIFICANT CHANGE UP (ref 9.8–12.7)
RBC # BLD: 5.11 M/UL — SIGNIFICANT CHANGE UP (ref 4.2–5.8)
RBC # FLD: 13.4 % — SIGNIFICANT CHANGE UP (ref 10.3–14.5)
SODIUM SERPL-SCNC: 140 MMOL/L — SIGNIFICANT CHANGE UP (ref 135–145)
WBC # BLD: 4.6 K/UL — SIGNIFICANT CHANGE UP (ref 3.8–10.5)
WBC # FLD AUTO: 4.6 K/UL — SIGNIFICANT CHANGE UP (ref 3.8–10.5)

## 2017-06-05 PROCEDURE — 93005 ELECTROCARDIOGRAM TRACING: CPT

## 2017-06-05 PROCEDURE — 93010 ELECTROCARDIOGRAM REPORT: CPT

## 2017-06-05 PROCEDURE — 85610 PROTHROMBIN TIME: CPT

## 2017-06-05 PROCEDURE — C1894: CPT

## 2017-06-05 PROCEDURE — 80053 COMPREHEN METABOLIC PANEL: CPT

## 2017-06-05 PROCEDURE — 37191 INS ENDOVAS VENA CAVA FILTR: CPT

## 2017-06-05 PROCEDURE — 85027 COMPLETE CBC AUTOMATED: CPT

## 2017-06-05 PROCEDURE — C1769: CPT

## 2017-06-05 PROCEDURE — 85730 THROMBOPLASTIN TIME PARTIAL: CPT

## 2017-06-05 PROCEDURE — C1880: CPT

## 2017-06-05 NOTE — H&P CARDIOLOGY - HISTORY OF PRESENT ILLNESS
75 y/o male with pmh of HTN, prostate CA (s/p chemo), obstructive BPH w/indwelling reyes, recurrent syncopal episodes dx with submassive PE b/l 2/2017 on Lovenox (last dose 6/4 am) seen and evaluated by Dr. Johnston, Vascular s/p CTA of chest 5/16/17 with no PE and LOWER CHEST: Right posterior basilar pleural plaque. Right basilar platelike consolidation compatible with atelectasis/chronic lung changes.   4 mm right lower lobe calcified granuloma. NO pelvic DVT.  Pt presents for IVC filter and denies cp, sob or palpitations presently.

## 2017-06-10 ENCOUNTER — EMERGENCY (EMERGENCY)
Facility: HOSPITAL | Age: 77
LOS: 1 days | Discharge: HOME | End: 2017-06-10
Attending: EMERGENCY MEDICINE | Admitting: EMERGENCY MEDICINE
Payer: MEDICARE

## 2017-06-10 VITALS
DIASTOLIC BLOOD PRESSURE: 81 MMHG | TEMPERATURE: 98 F | OXYGEN SATURATION: 99 % | SYSTOLIC BLOOD PRESSURE: 151 MMHG | HEART RATE: 85 BPM | RESPIRATION RATE: 16 BRPM

## 2017-06-10 VITALS
TEMPERATURE: 98 F | OXYGEN SATURATION: 98 % | SYSTOLIC BLOOD PRESSURE: 180 MMHG | RESPIRATION RATE: 18 BRPM | HEART RATE: 110 BPM | DIASTOLIC BLOOD PRESSURE: 100 MMHG

## 2017-06-10 DIAGNOSIS — D29.22 BENIGN NEOPLASM OF LEFT TESTIS: Chronic | ICD-10-CM

## 2017-06-10 PROCEDURE — 99285 EMERGENCY DEPT VISIT HI MDM: CPT | Mod: GC

## 2017-06-10 NOTE — ED PROVIDER NOTE - MEDICAL DECISION MAKING DETAILS
Pt is a 77 y/o male w/ PMHx of prostate CA and PE on lovenox who p/w urinary retention 2/2 reyes malfunction no signs of infection of bleeding. Flushed reyes w/out clots or blood and with adequate drainage. No need for labs at this time if UOP > 1500mL will obtain electrolytes and consider admission vs CDU otherwise pt likely d/aida with urology f/u. No need for UA or Cx at this time as pt with no infectious symptoms but will recheck vitals as tachycardia likely due to pain.

## 2017-06-10 NOTE — ED PROVIDER NOTE - OBJECTIVE STATEMENT
Pt is a 77 y/o male w/ PMHx of PE on lovenox, Prostate w/ chronic indwelling reyes who p/w lower abdominal pain and lack of reyes drainage x 1 day. pt noticed that yesterday that his urine output into reyes bag was decreased (normally two bags a day and only half a bag yesterday) and had stopped this morning. Pt denies fevers, nausea, vomiting, dysuria, blood in urine, clots in urine.     ROS: denies CP, SOB, +BM yesterday, no new rashes, no blood in BMs, no visual changes, no gait imbalance, no vertigo.

## 2017-06-10 NOTE — ED ADULT TRIAGE NOTE - CHIEF COMPLAINT QUOTE
c/o clogged reyes catheter a few hours, chronic reyes in place x 1 month for urinary retention due to enlarged prostate

## 2017-06-10 NOTE — ED PROVIDER NOTE - PROGRESS NOTE DETAILS
Flushed reyes with 50mL of Sterile saline and patient reyes began to drain urine. No blood or clots in urine bag after drainage. Patient taught how to flush reyes if clogged and if does not result in urine drainage pt should come to ER. 600mL of UOP will continue to monitor if UOP >1500mL will send basic labs to monitor electrolytes otherwise will d.c home with urology F.U w/ Dr. Harshal Gil. pt feels "much better" reyes draining yellow urine. pt feels "much better" reyes draining yellow urine. Comfortable in bed. Repeat HR 88. Pt will also need follow-up for his blood pressure management with PMD.

## 2017-06-10 NOTE — ED PROVIDER NOTE - ATTENDING CONTRIBUTION TO CARE
Attending Statement: I have personally seen and examined this patient. I have fully participated in the care of this patient. I have reviewed all pertinent clinical information, including history physical exam, plan and the Resident's note and agree except as noted   77yo M hx of PE on Lovenox, BPH w an indwelling reyes pw clogged reyes since this am.   Feels uncomfortable w urger to urinate. no fever or chills. no nausea/vomit. no hematuria.   Vital signs noted. pt standing looks uncomfortable, distended abdomen suprapubic. reyes bag empty. no cvat.  plan irrigate/change reyes. re assess

## 2017-06-10 NOTE — ED PROVIDER NOTE - CARE PLAN
Principal Discharge DX:	Monroy catheter problem Principal Discharge DX:	Reyes catheter problem  Goal:	Follow-up with urology.  Instructions for follow-up, activity and diet:	You were seen at [facility] ER for abdominal pain and low urine output due to a clog reyes. Your reyes was flushed and you had 700mL of urine out. Your vital signs and pain improved with relief of retention. You are to follow-up with your urologist (Dr. Harshal Gil) in the next week. Principal Discharge DX:	Reyes catheter problem  Goal:	Follow-up with urology.  Instructions for follow-up, activity and diet:	You were seen at [facility] ER for abdominal pain and low urine output due to a clog reyes. Your reyes was flushed and you had 700mL of urine out. Your vital signs and pain improved with relief of retention. You are to follow-up with your urologist (Dr. Harshal Gil) in the next week.

## 2017-06-23 ENCOUNTER — OUTPATIENT (OUTPATIENT)
Dept: OUTPATIENT SERVICES | Facility: HOSPITAL | Age: 77
LOS: 1 days | End: 2017-06-23
Payer: MEDICARE

## 2017-06-23 VITALS
SYSTOLIC BLOOD PRESSURE: 136 MMHG | HEIGHT: 67 IN | RESPIRATION RATE: 16 BRPM | OXYGEN SATURATION: 96 % | DIASTOLIC BLOOD PRESSURE: 76 MMHG | HEART RATE: 79 BPM | WEIGHT: 190.04 LBS | TEMPERATURE: 97 F

## 2017-06-23 DIAGNOSIS — Z98.890 OTHER SPECIFIED POSTPROCEDURAL STATES: Chronic | ICD-10-CM

## 2017-06-23 DIAGNOSIS — R33.9 RETENTION OF URINE, UNSPECIFIED: ICD-10-CM

## 2017-06-23 DIAGNOSIS — I26.99 OTHER PULMONARY EMBOLISM WITHOUT ACUTE COR PULMONALE: ICD-10-CM

## 2017-06-23 DIAGNOSIS — D29.22 BENIGN NEOPLASM OF LEFT TESTIS: Chronic | ICD-10-CM
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
## 2017-06-23 DIAGNOSIS — Z95.828 PRESENCE OF OTHER VASCULAR IMPLANTS AND GRAFTS: Chronic | ICD-10-CM

## 2017-06-23 DIAGNOSIS — J45.909 UNSPECIFIED ASTHMA, UNCOMPLICATED: ICD-10-CM

## 2017-06-23 DIAGNOSIS — I10 ESSENTIAL (PRIMARY) HYPERTENSION: ICD-10-CM

## 2017-06-23 DIAGNOSIS — K08.89 OTHER SPECIFIED DISORDERS OF TEETH AND SUPPORTING STRUCTURES: ICD-10-CM

## 2017-06-23 DIAGNOSIS — R33.8 OTHER RETENTION OF URINE: ICD-10-CM

## 2017-06-23 LAB
APPEARANCE UR: SIGNIFICANT CHANGE UP
BACTERIA # UR AUTO: SIGNIFICANT CHANGE UP
BILIRUB UR-MCNC: NEGATIVE — SIGNIFICANT CHANGE UP
BLD GP AB SCN SERPL QL: NEGATIVE — SIGNIFICANT CHANGE UP
BLOOD UR QL VISUAL: HIGH
COLOR SPEC: YELLOW — SIGNIFICANT CHANGE UP
GLUCOSE UR-MCNC: NEGATIVE — SIGNIFICANT CHANGE UP
KETONES UR-MCNC: NEGATIVE — SIGNIFICANT CHANGE UP
LEUKOCYTE ESTERASE UR-ACNC: HIGH
MUCOUS THREADS # UR AUTO: SIGNIFICANT CHANGE UP
NITRITE UR-MCNC: NEGATIVE — SIGNIFICANT CHANGE UP
NON-SQ EPI CELLS # UR AUTO: <1 — SIGNIFICANT CHANGE UP
PH UR: 8 — SIGNIFICANT CHANGE UP (ref 4.6–8)
PROT UR-MCNC: 150 — HIGH
RBC CASTS # UR COMP ASSIST: >50 — HIGH (ref 0–?)
RH IG SCN BLD-IMP: NEGATIVE — SIGNIFICANT CHANGE UP
SP GR SPEC: 1.02 — SIGNIFICANT CHANGE UP (ref 1–1.03)
TRI-PHOS CRY # UR COMP ASSIST: SIGNIFICANT CHANGE UP (ref 0–0)
UROBILINOGEN FLD QL: NORMAL E.U. — SIGNIFICANT CHANGE UP (ref 0.1–0.2)
WBC UR QL: >50 — HIGH (ref 0–?)

## 2017-06-23 PROCEDURE — 93010 ELECTROCARDIOGRAM REPORT: CPT

## 2017-06-23 RX ORDER — AMLODIPINE BESYLATE 2.5 MG/1
1 TABLET ORAL
Qty: 0 | Refills: 0 | COMMUNITY

## 2017-06-23 RX ORDER — ENOXAPARIN SODIUM 100 MG/ML
1 INJECTION SUBCUTANEOUS
Qty: 30 | Refills: 0 | COMMUNITY

## 2017-06-23 RX ORDER — SODIUM CHLORIDE 9 MG/ML
1000 INJECTION, SOLUTION INTRAVENOUS
Qty: 0 | Refills: 0 | Status: DISCONTINUED | OUTPATIENT
Start: 2017-07-13 | End: 2017-07-13

## 2017-06-23 RX ORDER — MOMETASONE FUROATE AND FORMOTEROL FUMARATE DIHYDRATE 200; 5 UG/1; UG/1
1 AEROSOL RESPIRATORY (INHALATION)
Qty: 0 | Refills: 0 | COMMUNITY

## 2017-06-23 NOTE — H&P PST ADULT - NEGATIVE NEUROLOGICAL SYMPTOMS
no difficulty walking/no confusion/no focal seizures/no facial palsy/no headache/no generalized seizures/no vertigo/no transient paralysis/no paresthesias/no syncope/no hemiparesis/no loss of sensation/no tremors/no weakness

## 2017-06-23 NOTE — H&P PST ADULT - PROBLEM SELECTOR PLAN 5
Left upper loose tooth. Does not have a dentist. Referred to dental clinic  for dental clearance. Tami surgical coordinator of Dr Gli notified.

## 2017-06-23 NOTE — H&P PST ADULT - GASTROINTESTINAL COMMENTS
obese abdomen, reducible umbilical hernia, non tender umbilical hernia, denies pain, reports longstanding bulging to umbilicus.

## 2017-06-23 NOTE — H&P PST ADULT - PROBLEM SELECTOR PLAN 3
On lovenox, last dose 7/11/17 as per Dr Camargo pulmonary. Surgical coordinator Tami notified, Dr Gil in agreement. Pulmonary clearance in chart. Vascular clearance in chart. CT angio, Cath report in chart.

## 2017-06-23 NOTE — H&P PST ADULT - NEGATIVE GENERAL SYMPTOMS
no chills/no polyphagia/no anorexia/no polydipsia/no weight gain/no sweating/no polyuria/no malaise/no weight loss/no fatigue/no fever

## 2017-06-23 NOTE — H&P PST ADULT - RESPIRATORY AND THORAX COMMENTS
No recent asthma exacerbations, on dulera, diagnosed with asthmatic bronchitis by pulmonary after evaluation of PE in 2/2017

## 2017-06-23 NOTE — H&P PST ADULT - FALL HARM RISK CONCLUSION
[facility]    Hospitalist Progress Note    Date of Service (when I saw the patient): 02/12/2017    Assessment & Plan   Anaid Dockery is a 89 year old female who was admitted on 2/11/2017.  Norovirus was detected on stool testing consistent with enteritis due to Norovirus.  All other cultures including testing for Clostridium difficile have been negative, and there is no low clinical suspicion for any bacterial infection.  Volume status is much improved with aggressive IV fluid hydration, and retrospectively, her initial clinical presentation and clinical course with hypotension, delayed capillary refill, and elevated lactic acid level was probably due to hypovolemic shock rather than sepsis.  IV fluid therapy has been complicated by hyperchloremic metabolic acidosis today although bicarbonate stool losses could also contribute to metabolic acidosis.  She also has hypopotassemia today likely due to gastrointestinal losses.  So far, oral intake has not been adequate to compensate for ongoing diarrheal losses.  Her mentation appears to be approaching if not at baseline, but she may still be weaker than usual although normally only stands and takes a few steps using a walker with her family holding on to her at home.  She has become anemic with about 2 g drop in hemoglobin possibly due at least partly to hemodilution, but occult GI bleeding is possible and she does take NSAIDs chronically which put her at risk for upper GI bleeding.    Principal Problem:    Enteritis due to Norovirus  Active Problems:    Possible Hypovolemic shock (H) - hypotension, elevated lactate    CKD (chronic kidney disease) stage 3, GFR 30-59 ml/min    Dementia without behavioral disturbance, unspecified dementia type    Generalized muscle weakness    Hypopotassemia    Hyperchloremic metabolic acidosis    Anemia    Hypothyroidism, unspecified type    Essential hypertension with goal blood pressure less than  140/90    Chronic pain syndrome    Switch from isotonic saline to D5 half-normal saline with potassium chloride and slow infusion rate of IV fluids today  Advance clear liquid diet today as tolerated  Start scheduled doses of Imodium and titrate dosing depending upon clinical response  Check stool for occult blood and recheck hemoglobin tomorrow unless she develops gross bleeding  Anticipate PT evaluation prior to discharge to assist with disposition planning  Discontinue antibiotics  Replace potassium according to protocol    DVT Prophylaxis: Pneumatic Compression Devices  Code Status: DNR/DNI    Disposition: Expected discharge in 1-3 days.    Vinicius Naqvi MD    Interval History   Patient herself is not the best historian because of her underlying dementia.  However, she offers no complaints today and denies pain.  She also denies nausea and has been drinking some liquids today although not large volumes.  Family reports that she is behaving more like her usual self.  She has not yet tried to get up out of bed.  Family says that she can normally stand with assistance and while holding onto a walker but that she normally ambulates very little and only with her family holding onto her and supporting her.  She has not had fever.  She has been stable hemodynamically.  Her respiratory status has been stable with normal oxygenation.  Urine output has been adequate although now more difficult to assess after discontinuation of her Velazquez catheter yesterday.   She continues to have frequent watery bowel movements.    -Data reviewed today: I reviewed all new labs and imaging results over the last 24 hours. I personally reviewed no images or EKG's today.    Physical Exam   Temp: 97.2  F (36.2  C) Temp src: Oral BP: 131/65 Pulse: 74 Heart Rate: 74 Resp: 16 SpO2: 93 % O2 Device: None (Room air)    Vitals:    02/11/17 0757 02/12/17 0601   Weight: 80 kg (176 lb 5.9 oz) 83 kg (182 lb 15.7 oz)     Vital Signs with Ranges  Temp:   [96.5  F (35.8  C)-97.7  F (36.5  C)] 97.2  F (36.2  C)  Pulse:  [70-75] 74  Heart Rate:  [74-76] 74  Resp:  [16-20] 16  BP: (111-139)/(58-69) 131/65  SpO2:  [93 %-97 %] 93 %  I/O last 3 completed shifts:  In: 820 [P.O.:580; I.V.:240]  Out: 900 [Urine:900]    Constitutional: No acute distress sitting up in bed, appears pale   Respiratory: Normal respiratory effort, clear lungs   Cardiovascular: Regular rate and rhythm, good radial pulse, normal capillary refill   GI: Nondistended abdomen, soft, no tenderness   Neuro: Alert and maintains wakefulness, obvious memory problems, able to follow some simple instructions, cooperative behavior     Medications     NaCl 150 mL/hr at 02/12/17 1105       levothyroxine  125 mcg Oral Daily     QUEtiapine  25 mg Oral At Bedtime     ranitidine  300 mg Oral Daily     piperacillin-tazobactam  3.375 g Intravenous Q8H JULIA     metroNIDAZOLE  250 mg Oral Q8H JULIA     vancomycin (VANCOCIN) IV  1,500 mg Intravenous Q24H     aspirin EC  81 mg Oral Daily     diclofenac  50 mg Oral BID     donepezil  10 mg Oral At Bedtime     FLUoxetine  20 mg Oral Daily     loratadine  10 mg Oral QPM       Data   Data reviewed today:  I personally reviewed no images or EKG's today.    Recent Labs  Lab 02/12/17  0540 02/11/17  0233   WBC 6.6 19.3*   HGB 9.8* 12.4   MCV 94 93    284    143   POTASSIUM 3.1* 4.0   CHLORIDE 117* 108   CO2 18* 27   BUN 12 22   CR 0.78 0.96   ANIONGAP 9 8   DIOGO 7.1* 8.7   GLC 87 125*   ALBUMIN  --  3.5   PROTTOTAL  --  7.0   BILITOTAL  --  0.2   ALKPHOS  --  56   ALT  --  20   AST  --  20     Stool testing for enteric pathogens was positive for Norovirus but otherwise negative  Stool testing for clostridia difficile toxin PCR was negative  Blood cultures are negative to date  Urine culture has grown only mixed growth    Lactic acid today is normal at 0.6, venous pH is 7.35 with PCO2 36 and bicarbonate 20     Fall Risk

## 2017-06-23 NOTE — H&P PST ADULT - HISTORY OF PRESENT ILLNESS
76 year old male with Hx of prostate cancer, s/p radiation in 2010, planning TURP, rescheduled twice in the past, complicated by PE 2/2017, treated with PTA then heparin now on Lovenox, s/p IVC filter in 6/2017. Pt requiring placement reyes catheter placement since 12/2016 for urinary retention. Reyes catheter was changed last week in surgeon's office. Pt is scheduled for cystoscopy and transurethral resection of prostate for 7/13/17. 76 year old male with Hx of prostate cancer, s/p radiation in 2010, planning TURP, rescheduled twice in the past, complicated by PE 2/2017, treated with PTA then heparin now on Lovenox, s/p IVC filter in 6/2017. Pt requiring placement reyes catheter since 12/2016 for urinary retention. Reyes catheter was changed last week in surgeon's office. Pt is scheduled for cystoscopy and transurethral resection of prostate for 7/13/17.

## 2017-06-23 NOTE — H&P PST ADULT - NEGATIVE MUSCULOSKELETAL SYMPTOMS
no neck pain/no arthralgia/no muscle weakness/no joint swelling/no muscle cramps/no back pain/no myalgia/no arthritis/no stiffness

## 2017-06-23 NOTE — H&P PST ADULT - MUSCULOSKELETAL
details… detailed exam ROM intact/no calf tenderness/no joint erythema/normal strength/no joint swelling/no joint warmth

## 2017-06-23 NOTE — H&P PST ADULT - NEGATIVE CARDIOVASCULAR SYMPTOMS
no orthopnea/no peripheral edema/no palpitations/no paroxysmal nocturnal dyspnea/no dyspnea on exertion/no chest pain

## 2017-06-23 NOTE — H&P PST ADULT - NEGATIVE OPHTHALMOLOGIC SYMPTOMS
no discharge L/no diplopia/no photophobia/no loss of vision R/no blurred vision L/no loss of vision L/no discharge R/no blurred vision R/no pain L/no pain R

## 2017-06-23 NOTE — H&P PST ADULT - RS GEN PE MLT RESP DETAILS PC
clear to auscultation bilaterally/breath sounds equal/good air movement/no intercostal retractions/respirations non-labored/no wheezes/no rhonchi/no rales/no chest wall tenderness/no subcutaneous emphysema/airway patent

## 2017-06-23 NOTE — H&P PST ADULT - PMH
Asthmatic bronchitis    Hypertension    PE (pulmonary thromboembolism)  02/2017-extensive bilateral PE's treated with TPA> Heparin and now on Lovenox 120 MG daily  Prostate cancer  2010 - s/p RT  Umbilical hernia    Urinary retention

## 2017-06-23 NOTE — H&P PST ADULT - PROBLEM SELECTOR PLAN 1
Pt is scheduled for cystoscopy and transurethral resection of prostate for 7/13/17. Preop instructions, pepcid provided. Pt stated understanding. KATIE precaution, OR booking notified.

## 2017-06-23 NOTE — H&P PST ADULT - NEGATIVE ENMT SYMPTOMS
no vertigo/no throat pain/no nasal obstruction/no nasal discharge/no post-nasal discharge/no dysphagia/no ear pain/no nasal congestion/no tinnitus/no sinus symptoms
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
## 2017-06-23 NOTE — H&P PST ADULT - PROBLEM SELECTOR PLAN 2
instructed to take amlodipine and losartan on the morning of procedure with a sip of water. Pending echo 2017 from cardiologist.

## 2017-06-25 LAB
BACTERIA UR CULT: SIGNIFICANT CHANGE UP
SPECIMEN SOURCE: SIGNIFICANT CHANGE UP

## 2017-07-11 ENCOUNTER — APPOINTMENT (OUTPATIENT)
Dept: PULMONOLOGY | Facility: CLINIC | Age: 77
End: 2017-07-11

## 2017-07-11 VITALS
OXYGEN SATURATION: 99 % | HEIGHT: 67 IN | WEIGHT: 192 LBS | SYSTOLIC BLOOD PRESSURE: 124 MMHG | DIASTOLIC BLOOD PRESSURE: 78 MMHG | BODY MASS INDEX: 30.13 KG/M2 | TEMPERATURE: 98.3 F | RESPIRATION RATE: 15 BRPM | HEART RATE: 93 BPM

## 2017-07-13 ENCOUNTER — TRANSCRIPTION ENCOUNTER (OUTPATIENT)
Age: 77
End: 2017-07-13

## 2017-07-13 ENCOUNTER — RESULT REVIEW (OUTPATIENT)
Age: 77
End: 2017-07-13

## 2017-07-13 ENCOUNTER — INPATIENT (INPATIENT)
Facility: HOSPITAL | Age: 77
LOS: 0 days | Discharge: HOME | End: 2017-07-14
Attending: UROLOGY | Admitting: UROLOGY
Payer: MEDICARE

## 2017-07-13 VITALS
HEART RATE: 82 BPM | HEIGHT: 67 IN | OXYGEN SATURATION: 96 % | SYSTOLIC BLOOD PRESSURE: 134 MMHG | DIASTOLIC BLOOD PRESSURE: 74 MMHG | WEIGHT: 190.04 LBS | TEMPERATURE: 98 F | RESPIRATION RATE: 15 BRPM

## 2017-07-13 DIAGNOSIS — Z95.828 PRESENCE OF OTHER VASCULAR IMPLANTS AND GRAFTS: Chronic | ICD-10-CM

## 2017-07-13 DIAGNOSIS — R33.8 OTHER RETENTION OF URINE: ICD-10-CM

## 2017-07-13 DIAGNOSIS — D29.22 BENIGN NEOPLASM OF LEFT TESTIS: Chronic | ICD-10-CM

## 2017-07-13 DIAGNOSIS — Z98.890 OTHER SPECIFIED POSTPROCEDURAL STATES: Chronic | ICD-10-CM
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
## 2017-07-13 DIAGNOSIS — C61 MALIGNANT NEOPLASM OF PROSTATE: ICD-10-CM

## 2017-07-13 DIAGNOSIS — D62 ACUTE POSTHEMORRHAGIC ANEMIA: ICD-10-CM

## 2017-07-13 DIAGNOSIS — Z29.9 ENCOUNTER FOR PROPHYLACTIC MEASURES, UNSPECIFIED: ICD-10-CM

## 2017-07-13 DIAGNOSIS — J45.909 UNSPECIFIED ASTHMA, UNCOMPLICATED: ICD-10-CM

## 2017-07-13 LAB
APTT BLD: 29.9 SEC — SIGNIFICANT CHANGE UP (ref 27.5–37.4)
BUN SERPL-MCNC: 25 MG/DL — HIGH (ref 7–23)
CALCIUM SERPL-MCNC: 9.4 MG/DL — SIGNIFICANT CHANGE UP (ref 8.4–10.5)
CHLORIDE SERPL-SCNC: 106 MMOL/L — SIGNIFICANT CHANGE UP (ref 98–107)
CO2 SERPL-SCNC: 21 MMOL/L — LOW (ref 22–31)
CREAT SERPL-MCNC: 1.62 MG/DL — HIGH (ref 0.5–1.3)
GLUCOSE SERPL-MCNC: 104 MG/DL — HIGH (ref 70–99)
HCT VFR BLD CALC: 41.5 % — SIGNIFICANT CHANGE UP (ref 39–50)
HGB BLD-MCNC: 13.2 G/DL — SIGNIFICANT CHANGE UP (ref 13–17)
INR BLD: 0.98 — SIGNIFICANT CHANGE UP (ref 0.88–1.17)
MCHC RBC-ENTMCNC: 27.2 PG — SIGNIFICANT CHANGE UP (ref 27–34)
MCHC RBC-ENTMCNC: 31.8 % — LOW (ref 32–36)
MCV RBC AUTO: 85.4 FL — SIGNIFICANT CHANGE UP (ref 80–100)
NRBC # FLD: 0 — SIGNIFICANT CHANGE UP
PLATELET # BLD AUTO: 221 K/UL — SIGNIFICANT CHANGE UP (ref 150–400)
PMV BLD: 11.1 FL — SIGNIFICANT CHANGE UP (ref 7–13)
POTASSIUM SERPL-MCNC: 5.5 MMOL/L — HIGH (ref 3.5–5.3)
POTASSIUM SERPL-SCNC: 5.5 MMOL/L — HIGH (ref 3.5–5.3)
PROTHROM AB SERPL-ACNC: 11 SEC — SIGNIFICANT CHANGE UP (ref 9.8–13.1)
RBC # BLD: 4.86 M/UL — SIGNIFICANT CHANGE UP (ref 4.2–5.8)
RBC # FLD: 14.7 % — HIGH (ref 10.3–14.5)
RH IG SCN BLD-IMP: NEGATIVE — SIGNIFICANT CHANGE UP
SODIUM SERPL-SCNC: 140 MMOL/L — SIGNIFICANT CHANGE UP (ref 135–145)
WBC # BLD: 5.19 K/UL — SIGNIFICANT CHANGE UP (ref 3.8–10.5)
WBC # FLD AUTO: 5.19 K/UL — SIGNIFICANT CHANGE UP (ref 3.8–10.5)

## 2017-07-13 PROCEDURE — 88342 IMHCHEM/IMCYTCHM 1ST ANTB: CPT | Mod: 26

## 2017-07-13 PROCEDURE — 88305 TISSUE EXAM BY PATHOLOGIST: CPT | Mod: 26

## 2017-07-13 PROCEDURE — 88341 IMHCHEM/IMCYTCHM EA ADD ANTB: CPT | Mod: 26

## 2017-07-13 PROCEDURE — 99223 1ST HOSP IP/OBS HIGH 75: CPT

## 2017-07-13 RX ORDER — SENNA PLUS 8.6 MG/1
2 TABLET ORAL AT BEDTIME
Qty: 0 | Refills: 0 | Status: DISCONTINUED | OUTPATIENT
Start: 2017-07-13 | End: 2017-07-14

## 2017-07-13 RX ORDER — OXYCODONE HYDROCHLORIDE 5 MG/1
10 TABLET ORAL EVERY 4 HOURS
Qty: 0 | Refills: 0 | Status: DISCONTINUED | OUTPATIENT
Start: 2017-07-13 | End: 2017-07-14

## 2017-07-13 RX ORDER — CEFAZOLIN SODIUM 1 G
1000 VIAL (EA) INJECTION EVERY 8 HOURS
Qty: 0 | Refills: 0 | Status: DISCONTINUED | OUTPATIENT
Start: 2017-07-13 | End: 2017-07-14

## 2017-07-13 RX ORDER — AMLODIPINE BESYLATE 2.5 MG/1
5 TABLET ORAL
Qty: 0 | Refills: 0 | Status: DISCONTINUED | OUTPATIENT
Start: 2017-07-13 | End: 2017-07-14

## 2017-07-13 RX ORDER — ACETAMINOPHEN 500 MG
650 TABLET ORAL EVERY 6 HOURS
Qty: 0 | Refills: 0 | Status: DISCONTINUED | OUTPATIENT
Start: 2017-07-13 | End: 2017-07-14

## 2017-07-13 RX ORDER — ACETAMINOPHEN 500 MG
1000 TABLET ORAL ONCE
Qty: 0 | Refills: 0 | Status: DISCONTINUED | OUTPATIENT
Start: 2017-07-13 | End: 2017-07-13

## 2017-07-13 RX ORDER — CEFAZOLIN SODIUM 1 G
1000 VIAL (EA) INJECTION ONCE
Qty: 0 | Refills: 0 | Status: COMPLETED | OUTPATIENT
Start: 2017-07-13 | End: 2017-07-13

## 2017-07-13 RX ORDER — HEPARIN SODIUM 5000 [USP'U]/ML
5000 INJECTION INTRAVENOUS; SUBCUTANEOUS EVERY 8 HOURS
Qty: 0 | Refills: 0 | Status: DISCONTINUED | OUTPATIENT
Start: 2017-07-13 | End: 2017-07-14

## 2017-07-13 RX ORDER — ONDANSETRON 8 MG/1
4 TABLET, FILM COATED ORAL ONCE
Qty: 0 | Refills: 0 | Status: DISCONTINUED | OUTPATIENT
Start: 2017-07-13 | End: 2017-07-13

## 2017-07-13 RX ORDER — DOCUSATE SODIUM 100 MG
100 CAPSULE ORAL THREE TIMES A DAY
Qty: 0 | Refills: 0 | Status: DISCONTINUED | OUTPATIENT
Start: 2017-07-13 | End: 2017-07-14

## 2017-07-13 RX ORDER — SODIUM CHLORIDE 9 MG/ML
1000 INJECTION, SOLUTION INTRAVENOUS
Qty: 0 | Refills: 0 | Status: DISCONTINUED | OUTPATIENT
Start: 2017-07-13 | End: 2017-07-14

## 2017-07-13 RX ORDER — LOSARTAN POTASSIUM 100 MG/1
100 TABLET, FILM COATED ORAL DAILY
Qty: 0 | Refills: 0 | Status: DISCONTINUED | OUTPATIENT
Start: 2017-07-13 | End: 2017-07-14

## 2017-07-13 RX ORDER — OXYCODONE HYDROCHLORIDE 5 MG/1
5 TABLET ORAL EVERY 4 HOURS
Qty: 0 | Refills: 0 | Status: DISCONTINUED | OUTPATIENT
Start: 2017-07-13 | End: 2017-07-14

## 2017-07-13 RX ORDER — CEFAZOLIN SODIUM 1 G
VIAL (EA) INJECTION
Qty: 0 | Refills: 0 | Status: DISCONTINUED | OUTPATIENT
Start: 2017-07-13 | End: 2017-07-14

## 2017-07-13 RX ORDER — ONDANSETRON 8 MG/1
4 TABLET, FILM COATED ORAL EVERY 6 HOURS
Qty: 0 | Refills: 0 | Status: DISCONTINUED | OUTPATIENT
Start: 2017-07-13 | End: 2017-07-14

## 2017-07-13 RX ORDER — BUDESONIDE AND FORMOTEROL FUMARATE DIHYDRATE 160; 4.5 UG/1; UG/1
2 AEROSOL RESPIRATORY (INHALATION)
Qty: 0 | Refills: 0 | Status: DISCONTINUED | OUTPATIENT
Start: 2017-07-13 | End: 2017-07-14

## 2017-07-13 RX ORDER — LIDOCAINE 4 G/100G
1 CREAM TOPICAL
Qty: 0 | Refills: 0 | Status: DISCONTINUED | OUTPATIENT
Start: 2017-07-13 | End: 2017-07-14

## 2017-07-13 RX ORDER — HYDROMORPHONE HYDROCHLORIDE 2 MG/ML
0.5 INJECTION INTRAMUSCULAR; INTRAVENOUS; SUBCUTANEOUS
Qty: 0 | Refills: 0 | Status: DISCONTINUED | OUTPATIENT
Start: 2017-07-13 | End: 2017-07-13

## 2017-07-13 RX ADMIN — Medication 100 MILLIGRAM(S): at 13:27

## 2017-07-13 RX ADMIN — Medication 30 MILLILITER(S): at 21:51

## 2017-07-13 RX ADMIN — SODIUM CHLORIDE 30 MILLILITER(S): 9 INJECTION, SOLUTION INTRAVENOUS at 08:09

## 2017-07-13 RX ADMIN — Medication 100 MILLIGRAM(S): at 21:52

## 2017-07-13 RX ADMIN — HEPARIN SODIUM 5000 UNIT(S): 5000 INJECTION INTRAVENOUS; SUBCUTANEOUS at 21:52

## 2017-07-13 RX ADMIN — BUDESONIDE AND FORMOTEROL FUMARATE DIHYDRATE 2 PUFF(S): 160; 4.5 AEROSOL RESPIRATORY (INHALATION) at 21:52

## 2017-07-13 RX ADMIN — LIDOCAINE 1 APPLICATION(S): 4 CREAM TOPICAL at 13:46

## 2017-07-13 RX ADMIN — Medication 100 MILLIGRAM(S): at 17:48

## 2017-07-13 RX ADMIN — AMLODIPINE BESYLATE 5 MILLIGRAM(S): 2.5 TABLET ORAL at 17:52

## 2017-07-13 RX ADMIN — HEPARIN SODIUM 5000 UNIT(S): 5000 INJECTION INTRAVENOUS; SUBCUTANEOUS at 13:27

## 2017-07-13 NOTE — PATIENT PROFILE ADULT. - VISION (WITH CORRECTIVE LENSES IF THE PATIENT USUALLY WEARS THEM):
Normal vision: sees adequately in most situations; can see medication labels, newsprint/glasses glasses/Partially impaired: cannot see medication labels or newsprint, but can see obstacles in path, and the surrounding layout; can count fingers at arm's length

## 2017-07-13 NOTE — PROGRESS NOTE ADULT - SUBJECTIVE AND OBJECTIVE BOX
Post op Check- bTURP    Pt seen and examined without complaints. Pain is controlled. Denies SOB/CP/N/V.     Vital Signs Last 24 Hrs  T(C): 36.6 (13 Jul 2017 12:57), Max: 36.9 (13 Jul 2017 10:10)  T(F): 97.9 (13 Jul 2017 12:57), Max: 98.4 (13 Jul 2017 10:10)  HR: 83 (13 Jul 2017 12:57) (82 - 109)  BP: 141/66 (13 Jul 2017 12:57) (134/74 - 167/73)  BP(mean): --  RR: 18 (13 Jul 2017 12:57) (13 - 20)  SpO2: 99% (13 Jul 2017 12:57) (94% - 99%)    I&O's Summary    CBI clear        Physical Exam  Gen: NAD  Pulm: No respiratory distress, clear to auscultation  CV: RRR  Abd: Obese, reducible umbilical hernia, soft, nontender, no suprapubic tenderness or fullness  : Porfirio secured                           13.2   5.19  )-----------( 221      ( 13 Jul 2017 07:00 )             41.5       07-13    140  |  106  |  25<H>  ----------------------------<  104<H>  5.5<H>   |  21<L>  |  1.62<H>    Ca    9.4      13 Jul 2017 07:00

## 2017-07-13 NOTE — CONSULT NOTE ADULT - ASSESSMENT
76 year old male with Hx of HTN, prostate cancer, s/p radiation in 2010, BPH, asthmatic bronchitis, extensive bilateral PE in 2/2017 treated with tPA and heparin drip and now on lovenox 120 mg qd (held a week prior to surgery), urinary retention, had cystoscopy/ TURP on 7/13. 76 year old male with Hx of HTN, prostate cancer, s/p radiation in 2010, BPH, asthmatic bronchitis, extensive bilateral PE in 2/2017 treated with tPA and heparin drip and now on lovenox 120 mg qd (held a week prior to surgery), IVC filter 6/2017, urinary retention, had cystoscopy/ TURP on 7/13.

## 2017-07-13 NOTE — CONSULT NOTE ADULT - PROBLEM SELECTOR RECOMMENDATION 3
-prior extensive B/l PE treated with tPA/heparin, on lovenox 120 mg qd prior to surgery  -Lovenox on hold, please resume as soon as he's cleared by urology -prior extensive B/l PE treated with tPA/heparin, on lovenox 120 mg qd prior to surgery  -IVC filter placed 6/2017  -Lovenox on hold, please resume as soon as he's cleared by urology
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
## 2017-07-13 NOTE — CONSULT NOTE ADULT - PROBLEM SELECTOR RECOMMENDATION 9
due to prostate CA/BPH  -s/p cystoscopy/TURP on 7/13, postop care as per , pain control, bowel regimen, CBI, IVF

## 2017-07-14 VITALS
HEART RATE: 73 BPM | OXYGEN SATURATION: 99 % | SYSTOLIC BLOOD PRESSURE: 155 MMHG | RESPIRATION RATE: 17 BRPM | TEMPERATURE: 98 F | DIASTOLIC BLOOD PRESSURE: 69 MMHG

## 2017-07-14 LAB
BASOPHILS # BLD AUTO: 0.02 K/UL — SIGNIFICANT CHANGE UP (ref 0–0.2)
BASOPHILS NFR BLD AUTO: 0.2 % — SIGNIFICANT CHANGE UP (ref 0–2)
BUN SERPL-MCNC: 20 MG/DL — SIGNIFICANT CHANGE UP (ref 7–23)
CALCIUM SERPL-MCNC: 9.3 MG/DL — SIGNIFICANT CHANGE UP (ref 8.4–10.5)
CHLORIDE SERPL-SCNC: 102 MMOL/L — SIGNIFICANT CHANGE UP (ref 98–107)
CO2 SERPL-SCNC: 23 MMOL/L — SIGNIFICANT CHANGE UP (ref 22–31)
CREAT SERPL-MCNC: 1.26 MG/DL — SIGNIFICANT CHANGE UP (ref 0.5–1.3)
EOSINOPHIL # BLD AUTO: 0 K/UL — SIGNIFICANT CHANGE UP (ref 0–0.5)
EOSINOPHIL NFR BLD AUTO: 0 % — SIGNIFICANT CHANGE UP (ref 0–6)
GLUCOSE SERPL-MCNC: 101 MG/DL — HIGH (ref 70–99)
HCT VFR BLD CALC: 41.2 % — SIGNIFICANT CHANGE UP (ref 39–50)
HGB BLD-MCNC: 13.5 G/DL — SIGNIFICANT CHANGE UP (ref 13–17)
IMM GRANULOCYTES # BLD AUTO: 0.04 # — SIGNIFICANT CHANGE UP
IMM GRANULOCYTES NFR BLD AUTO: 0.4 % — SIGNIFICANT CHANGE UP (ref 0–1.5)
LYMPHOCYTES # BLD AUTO: 1.72 K/UL — SIGNIFICANT CHANGE UP (ref 1–3.3)
LYMPHOCYTES # BLD AUTO: 17.2 % — SIGNIFICANT CHANGE UP (ref 13–44)
MCHC RBC-ENTMCNC: 27.8 PG — SIGNIFICANT CHANGE UP (ref 27–34)
MCHC RBC-ENTMCNC: 32.8 % — SIGNIFICANT CHANGE UP (ref 32–36)
MCV RBC AUTO: 84.9 FL — SIGNIFICANT CHANGE UP (ref 80–100)
MONOCYTES # BLD AUTO: 0.87 K/UL — SIGNIFICANT CHANGE UP (ref 0–0.9)
MONOCYTES NFR BLD AUTO: 8.7 % — SIGNIFICANT CHANGE UP (ref 2–14)
NEUTROPHILS # BLD AUTO: 7.37 K/UL — SIGNIFICANT CHANGE UP (ref 1.8–7.4)
NEUTROPHILS NFR BLD AUTO: 73.5 % — SIGNIFICANT CHANGE UP (ref 43–77)
NRBC # FLD: 0 — SIGNIFICANT CHANGE UP
PLATELET # BLD AUTO: 228 K/UL — SIGNIFICANT CHANGE UP (ref 150–400)
PMV BLD: 11 FL — SIGNIFICANT CHANGE UP (ref 7–13)
POTASSIUM SERPL-MCNC: 4.8 MMOL/L — SIGNIFICANT CHANGE UP (ref 3.5–5.3)
POTASSIUM SERPL-SCNC: 4.8 MMOL/L — SIGNIFICANT CHANGE UP (ref 3.5–5.3)
RBC # BLD: 4.85 M/UL — SIGNIFICANT CHANGE UP (ref 4.2–5.8)
RBC # FLD: 14.6 % — HIGH (ref 10.3–14.5)
SODIUM SERPL-SCNC: 136 MMOL/L — SIGNIFICANT CHANGE UP (ref 135–145)
WBC # BLD: 10.02 K/UL — SIGNIFICANT CHANGE UP (ref 3.8–10.5)
WBC # FLD AUTO: 10.02 K/UL — SIGNIFICANT CHANGE UP (ref 3.8–10.5)

## 2017-07-14 PROCEDURE — 99233 SBSQ HOSP IP/OBS HIGH 50: CPT

## 2017-07-14 RX ORDER — DOCUSATE SODIUM 100 MG
1 CAPSULE ORAL
Qty: 0 | Refills: 0 | COMMUNITY
Start: 2017-07-14

## 2017-07-14 RX ORDER — ENOXAPARIN SODIUM 100 MG/ML
1 INJECTION SUBCUTANEOUS
Qty: 30 | Refills: 0 | COMMUNITY

## 2017-07-14 RX ORDER — OXYCODONE HYDROCHLORIDE 5 MG/1
1 TABLET ORAL
Qty: 12 | Refills: 0 | OUTPATIENT
Start: 2017-07-14 | End: 2017-07-17

## 2017-07-14 RX ORDER — CEPHALEXIN 500 MG
1 CAPSULE ORAL
Qty: 6 | Refills: 0 | OUTPATIENT
Start: 2017-07-14 | End: 2017-07-17

## 2017-07-14 RX ORDER — SODIUM CHLORIDE 9 MG/ML
1000 INJECTION, SOLUTION INTRAVENOUS
Qty: 0 | Refills: 0 | Status: DISCONTINUED | OUTPATIENT
Start: 2017-07-14 | End: 2017-07-14

## 2017-07-14 RX ORDER — SENNA PLUS 8.6 MG/1
2 TABLET ORAL
Qty: 0 | Refills: 0 | COMMUNITY
Start: 2017-07-14

## 2017-07-14 RX ADMIN — Medication 100 MILLIGRAM(S): at 00:06

## 2017-07-14 RX ADMIN — LIDOCAINE 1 APPLICATION(S): 4 CREAM TOPICAL at 08:36

## 2017-07-14 RX ADMIN — HEPARIN SODIUM 5000 UNIT(S): 5000 INJECTION INTRAVENOUS; SUBCUTANEOUS at 06:21

## 2017-07-14 RX ADMIN — Medication 100 MILLIGRAM(S): at 08:36

## 2017-07-14 RX ADMIN — HEPARIN SODIUM 5000 UNIT(S): 5000 INJECTION INTRAVENOUS; SUBCUTANEOUS at 14:43

## 2017-07-14 RX ADMIN — SODIUM CHLORIDE 75 MILLILITER(S): 9 INJECTION, SOLUTION INTRAVENOUS at 14:42

## 2017-07-14 RX ADMIN — SODIUM CHLORIDE 125 MILLILITER(S): 9 INJECTION, SOLUTION INTRAVENOUS at 08:37

## 2017-07-14 RX ADMIN — ONDANSETRON 4 MILLIGRAM(S): 8 TABLET, FILM COATED ORAL at 00:06

## 2017-07-14 RX ADMIN — Medication 100 MILLIGRAM(S): at 14:43

## 2017-07-14 RX ADMIN — AMLODIPINE BESYLATE 5 MILLIGRAM(S): 2.5 TABLET ORAL at 06:20

## 2017-07-14 RX ADMIN — LOSARTAN POTASSIUM 100 MILLIGRAM(S): 100 TABLET, FILM COATED ORAL at 06:23

## 2017-07-14 RX ADMIN — Medication 100 MILLIGRAM(S): at 06:20

## 2017-07-14 NOTE — PROGRESS NOTE ADULT - SUBJECTIVE AND OBJECTIVE BOX
POD #1    Afeb 147/67 82 98%RA    Pt has no c/o    Abd- soft NT ND    CBI off a few hours  [place] clear 550 cc

## 2017-07-14 NOTE — DISCHARGE NOTE ADULT - ADDITIONAL INSTRUCTIONS
Call Dr. Gil for appointment [phone]  Call the office if you have fever greater than 101, difficulty urinating, pain not relieved with pain medication, nausea/vomiting.

## 2017-07-14 NOTE — DISCHARGE NOTE ADULT - HOSPITAL COURSE
Underwent uncomplicated TURP on 7/13.  CBI d/c POD #0. Urine remained acceptable in color.  Pain well controlled, tolerating diet, ambulatory.  Successful TOV POD #.  Pt d/c on POD #1 to f/u with  [phone] Underwent uncomplicated TURP on 7/13.  CBI d/c POD #0. Urine remained acceptable in color.  Pain well controlled, tolerating diet, ambulatory.  Successful TOV POD #1.   Pt d/c on POD #1 to f/u with  [phone]

## 2017-07-14 NOTE — DISCHARGE NOTE ADULT - PLAN OF CARE
surgical recovery Call Dr. Gil for a follow up appointment _______________________  [phone]   ** Do not Resume your Lovenox until you see Dr. Johnston your vascular MD ** Call Dr. Gil for a follow up appointment within 1- 2 weeks  [phone]   ** Do not Resume your Lovenox until you see Dr. Johnston your vascular MD **

## 2017-07-14 NOTE — DISCHARGE NOTE ADULT - PATIENT PORTAL LINK FT
“You can access the Follow[employer] Patient Portal, offered by [facility], by registering with the following website: [email or web address]”

## 2017-07-14 NOTE — DISCHARGE NOTE ADULT - MEDICATION SUMMARY - MEDICATIONS TO TAKE
I will START or STAY ON the medications listed below when I get home from the hospital:    acetaminophen-oxycodone 325 mg-5 mg oral tablet  -- 1 tab(s) by mouth every 6 hours, As Needed moderate to severe pain MDD:4  -- Caution federal law prohibits the transfer of this drug to any person other  than the person for whom it was prescribed.  May cause drowsiness.  Alcohol may intensify this effect.  Use care when operating dangerous machinery.  This prescription cannot be refilled.  This product contains acetaminophen.  Do not use  with any other product containing acetaminophen to prevent possible liver damage.  Using more of this medication than prescribed may cause serious breathing problems.    -- Indication: For Post op pain    losartan 100 mg oral tablet  -- 1 tab(s) by mouth once a day  -- Indication: For Home    Dulera 200 mcg-5 mcg/inh inhalation aerosol  -- 1 puff daily  -- Indication: For Home    amLODIPine 5 mg oral tablet  -- 1 tab(s) by mouth 2 times a day  -- Indication: For Home    Keflex 500 mg oral capsule  -- 1 cap(s) by mouth 2 times a day  -- Finish all this medication unless otherwise directed by prescriber.    -- Indication: For Post op antibiotics    senna oral tablet  -- 2 tab(s) by mouth once a day (at bedtime), As needed, Constipation  -- Indication: For Constipation    docusate sodium 100 mg oral capsule  -- 1 cap(s) by mouth 3 times a day  -- Indication: For Constipation

## 2017-07-14 NOTE — PROGRESS NOTE ADULT - PROBLEM SELECTOR PLAN 1
D/C reyes  TOV  PVR
Ancef  IVF  Monitor color, wean CBI this afternoon if remains clear  TOV in AM  AM labs  DVT prophy, IS
-s/p TURP, reyes removed, TOV, tolerating diet, pain control, d/c plan per primary team

## 2017-07-14 NOTE — DISCHARGE NOTE ADULT - NS AS ACTIVITY OBS
Showering allowed/Walking-Indoors allowed/Stairs allowed/No Heavy lifting/straining/do not drive on narcotic pain meds/Walking-Outdoors allowed

## 2017-07-14 NOTE — DISCHARGE NOTE ADULT - CARE PROVIDERS DIRECT ADDRESSES
[email or web address]

## 2017-07-14 NOTE — PROGRESS NOTE ADULT - SUBJECTIVE AND OBJECTIVE BOX
Patient is a 76y old  Male who presents with a chief complaint of admitted for treatment of BPH via TURP (14 Jul 2017 09:28)      SUBJECTIVE / OVERNIGHT EVENTS:  Patient feels better, denies leg swelling or chest pain or sob. Urine is light pink.    MEDICATIONS  (STANDING):  losartan 100 milliGRAM(s) Oral daily  buDESOnide 160 MICROgram(s)/formoterol 4.5 MICROgram(s) Inhaler 2 Puff(s) Inhalation two times a day  amLODIPine   Tablet 5 milliGRAM(s) Oral two times a day  heparin  Injectable 5000 Unit(s) SubCutaneous every 8 hours  lactated ringers. 1000 milliLiter(s) (125 mL/Hr) IV Continuous <Continuous>  docusate sodium 100 milliGRAM(s) Oral three times a day  ceFAZolin   IVPB   IV Intermittent   ceFAZolin   IVPB 1000 milliGRAM(s) IV Intermittent every 8 hours    MEDICATIONS  (PRN):  acetaminophen   Tablet. 650 milliGRAM(s) Oral every 6 hours PRN Mild Pain (1 - 3)  ondansetron Injectable 4 milliGRAM(s) IV Push every 6 hours PRN Nausea  senna 2 Tablet(s) Oral at bedtime PRN Constipation  oxyCODONE    IR 5 milliGRAM(s) Oral every 4 hours PRN Moderate Pain (4 - 6)  oxyCODONE    IR 10 milliGRAM(s) Oral every 4 hours PRN Severe Pain (7 - 10)  lidocaine 2% Gel 1 Application(s) Topical every 3 hours PRN penile irritation  aluminum hydroxide/magnesium hydroxide/simethicone Suspension 30 milliLiter(s) Oral every 4 hours PRN Dyspepsia      Vital Signs Last 24 Hrs  T(C): 36.7 (07-14-17 @ 09:18), Max: 37.7 (07-14-17 @ 01:21)  HR: 94 (07-14-17 @ 09:18) (81 - 96)  BP: 141/69 (07-14-17 @ 09:18) (141/66 - 164/75)  RR: 18 (07-14-17 @ 09:18) (17 - 18)  SpO2: 99% (07-14-17 @ 09:18) (96% - 99%)  CAPILLARY BLOOD GLUCOSE        I&O's Summary    13 Jul 2017 07:01  -  14 Jul 2017 07:00  --------------------------------------------------------  IN: 490 mL / OUT: 1750 mL / NET: -1260 mL    14 Jul 2017 07:01  -  14 Jul 2017 12:04  --------------------------------------------------------  IN: 0 mL / OUT: 200 mL / NET: -200 mL        PHYSICAL EXAM:  ENERAL: NAD, well-developed  HEAD:  Atraumatic, Normocephalic  EYES: EOMI, PERRLA, conjunctiva and sclera clear  NECK: Supple, No JVD  CHEST/LUNG: Clear to auscultation bilaterally; No wheeze  HEART: Regular rate and rhythm; No murmurs, rubs, or gallops  ABDOMEN: Soft, Umbilical hernia, protuberant, nontender; Bowel sounds present  EXTREMITIES:  2+ Peripheral Pulses, No clubbing, cyanosis, or edema  PSYCH: AAOx3  NEUROLOGY: non-focal  SKIN: No rashes or lesions    LABS:                        13.5   10.02 )-----------( 228      ( 14 Jul 2017 05:36 )             41.2     07-14    136  |  102  |  20  ----------------------------<  101<H>  4.8   |  23  |  1.26    Ca    9.3      14 Jul 2017 05:36      PT/INR - ( 13 Jul 2017 07:00 )   PT: 11.0 SEC;   INR: 0.98          PTT - ( 13 Jul 2017 07:00 )  PTT:29.9 SEC      Microbiology:     RADIOLOGY & ADDITIONAL TESTS:    Imaging Personally Reviewed:    Consultant(s) Notes Reviewed:      Care Discussed with Consultants/Other Providers:

## 2017-07-14 NOTE — DISCHARGE NOTE ADULT - INSTRUCTIONS
Notify Dr Gil if you experience any increase in  pain not relieved with pain medication, if you are unable to urinate or if you starting urinating bright red blood or pass clots.  Drink plenty of fluids.  use over the counter stool softeners to assist with constipation which can be a side effect of your medication. Slowly resume previous diet.  Initially avoid fatty, greasy foods and large meals  Call office if you have fever >101,difficulty urinating ,Nausea, Vomiting, Inability to eat, Slowly resume previous diet.  Initially avoid fatty, greasy foods and large meals  Call office if you have fever >101,difficulty urinating ,Nausea, Vomiting, Inability to eat [phone] Notify Dr Gil if you experience any increase in  pain not relieved with pain medication, if you are unable to urinate or if you starting urinating bright red blood or pass clots, or if you develop a fever.100.5 or shaking chills.  continue to take antibiotics as prescribed..  Drink plenty of fluids.  Use over the counter stool softeners to assist with constipation which can be a side effect of your medication.

## 2017-07-14 NOTE — DISCHARGE NOTE ADULT - CONDITIONS AT DISCHARGE
stable stable, tolerating diet, oob without difficulty, skin intact stable, tolerating diet, oob without difficulty, skin intact, voiding well after catheter d/cd

## 2017-07-14 NOTE — PROGRESS NOTE ADULT - ASSESSMENT
76 year old male with Hx of HTN, prostate cancer, s/p radiation in 2010, BPH, asthmatic bronchitis, extensive bilateral PE in 2/2017 treated with tPA and heparin drip and now on lovenox 120 mg qd (held a week prior to surgery), IVC filter 6/2017, urinary retention, had cystoscopy/ TURP on 7/13.

## 2017-07-14 NOTE — PROGRESS NOTE ADULT - SUBJECTIVE AND OBJECTIVE BOX
ANESTHESIA POSTOP CHECK    76y Male POSTOP DAY 1 S/P TURP     Vital Signs Last 24 Hrs  T(C): 36.7 (14 Jul 2017 09:18), Max: 37.7 (14 Jul 2017 01:21)  T(F): 98 (14 Jul 2017 09:18), Max: 99.8 (14 Jul 2017 01:21)  HR: 94 (14 Jul 2017 09:18) (81 - 109)  BP: 141/69 (14 Jul 2017 09:18) (135/67 - 167/73)  BP(mean): --  RR: 18 (14 Jul 2017 09:18) (13 - 20)  SpO2: 99% (14 Jul 2017 09:18) (94% - 99%)  I&O's Summary    13 Jul 2017 07:01  -  14 Jul 2017 07:00  --------------------------------------------------------  IN: 490 mL / OUT: 1750 mL / NET: -1260 mL    14 Jul 2017 07:01  -  14 Jul 2017 09:40  --------------------------------------------------------  IN: 0 mL / OUT: 200 mL / NET: -200 mL        [x ] NO APPARENT ANESTHESIA COMPLICATIONS      Comments:

## 2017-07-14 NOTE — DISCHARGE NOTE ADULT - CARE PLAN
Principal Discharge DX:	Urinary retention  Goal:	surgical recovery  Instructions for follow-up, activity and diet:	Call Dr. Gil for a follow up appointment _______________________  [phone]   ** Do not Resume your Lovenox until you see Dr. Johnston your vascular MD ** Principal Discharge DX:	Urinary retention  Goal:	surgical recovery  Instructions for follow-up, activity and diet:	Call Dr. Gil for a follow up appointment _______________________  [phone]   ** Do not Resume your Lovenox until you see Dr. Johnston your vascular MD ** Principal Discharge DX:	Urinary retention  Goal:	surgical recovery  Instructions for follow-up, activity and diet:	Call Dr. Gil for a follow up appointment _______________________  [phone]   ** Do not Resume your Lovenox until you see Dr. Johnston your vascular MD ** Principal Discharge DX:	Urinary retention  Goal:	surgical recovery  Instructions for follow-up, activity and diet:	Call Dr. Gil for a follow up appointment _______________________  [phone]   ** Do not Resume your Lovenox until you see Dr. Johnston your vascular MD ** Principal Discharge DX:	Urinary retention  Goal:	surgical recovery  Instructions for follow-up, activity and diet:	Call Dr. Gil for a follow up appointment within 1- 2 weeks  [phone]   ** Do not Resume your Lovenox until you see Dr. Johnston your vascular MD ** Principal Discharge DX:	Urinary retention  Goal:	surgical recovery  Instructions for follow-up, activity and diet:	Call Dr. Gil for a follow up appointment within 1- 2 weeks  [phone]   ** Do not Resume your Lovenox until you see Dr. Johnston your vascular MD **

## 2017-07-14 NOTE — DISCHARGE NOTE ADULT - MEDICATION SUMMARY - MEDICATIONS TO STOP TAKING
I will STOP taking the medications listed below when I get home from the hospital:    enoxaparin 120 mg/0.8 mL injectable solution  -- 1 dose(s) injectable once a day   Last dose 7/11/17 preop.

## 2017-07-14 NOTE — DISCHARGE NOTE ADULT - CARE PROVIDER_API CALL
Harshal Gil), Urology  [address]  [place] [address]  Phone: [phone]  Fax: [phone]    Mary Jo Mercedes, Cresencio BEAN), Internal Medicine; Pulmonary Disease  [address]  [place] [address]  Phone: [phone]  Fax: [phone]    James Johnston), Vascular Surgery  [address] [place] [address]  Phone: [phone]  Fax: [phone] Harshal Gil), Urology  [address] [facility] [address]  [place] [address]  Phone: [phone]  Fax: [phone]    Mary Jo Mercedes, Cresencio BEAN), Internal Medicine; Pulmonary Disease  [address]  [place] [address]  Phone: [phone]  Fax: [phone]    James Johnston), Vascular Surgery  [address] [place] [address]  Phone: [phone]  Fax: [phone]

## 2017-07-16 NOTE — ED PROVIDER NOTE - PLAN OF CARE
History of Present Illness





- General


Chief Complaint: Injury


Stated Complaint: LT FOOT PAIN


Time Seen by Provider: 17 16:04


History Source: Patient


Exam Limitations: No Limitations





- History of Present Illness


Initial Comments: 


CHIEF COMPLAINT:  29 y/o afebrile female c/o plantar fasciitis pain. 





HISTORY OF PRESENT ILLNESS:  THe patient states she knows she has plantar 

fasciitis but moved here from [place] and doesn't have a doctor.  She states 

she is here for a work note and podiatry referral because the plantar fasciitis 

in her left foot is killing her.  She states she works on her feet all day and 

takes 800mg of Ibuprofen twice a day for the pain.  She states she knows she 

needs to see the podiatrist for any long term relief.  She denies new injury to 

the foot. 





Vital signs on arrival are within normal limits. 





REVIEW OF SYSTEMS:


GENERAL/CONSTITUTIONAL: No fever/chills. No weakness. No weight change.


MUSCULOSKELETAL: +left foot pain. No neck or back pain.


SKIN: No rash or easy bruising.


NEUROLOGIC: No headache, vertigo, loss of consciousness, or loss of sensation.








PHYSICAL EXAM:


VITAL_SIGNS: within normal limits


GENERAL_APPEARANCE: alert, cooperative, mild obvious discomfort.  Pt walks with 

a slight limp. 


MENTAL_STATUS: speech clear, oriented X 3, responds appropriately to questions.


NEURO: motor intact and sensory intact in injured extremity.


EXTREMITIES: good pulse in injured extremity; Pain with palpation of left arch 

and heel of foot with some swelling to those areas.  Full ROM of left foot and 

ankle.  Some discomfort with dorsiflexion of left foot. 


SKIN: warm, dry, good color.

















Past History





- Past Medical History


Allergies/Adverse Reactions: 


 Allergies











Allergy/AdvReac Type Severity Reaction Status Date / Time


 


No Known Allergies Allergy   Verified 17 15:27











Home Medications: 


Ambulatory Orders





Chlorhexidine Gluconate [Hibiclens For Decolonization -] 1 applic TP DAILY #1 

bottle 17 


Sulfamethoxazole/Trimethoprim [Bactrim *Ds*] 1 each PO BID #14 tablet 17 








Anemia: Yes (only during pregnancy)


Asthma: No


Cancer: No


Cardiac Disorders: No


Diabetes: No


HTN: No


Seizures: No


Thyroid Disease: Yes (hypothyroidism only during pregnancy)


Other medical history: plantar fasciitis





- Reproductive History


 (#): 2


Para: 0


Therapeutic (s) & number: Yes


Spontaneous : 1





- Immunization History


Immunization Up to Date: Yes





- Psycho/Social/Smoking Cessation Hx


Anxiety: Yes


Suicidal Ideation: No


Smoking Status: No


Smoking History: Current every day smoker


Have you smoked in the past 12 months: No


Number of Cigarettes Smoked Daily: 4


If you are a former smoker, when did you quit?: 1 year ago


Information on smoking cessation initiated: Yes


'Breaking Loose' booklet given: 17


Hx Alcohol Use: No


Drug/Substance Use Hx: No


Substance Use Type: None


Hx Substance Use Treatment: No





*Physical Exam





- Vital Signs


 Last Vital Signs











Temp Pulse Resp BP Pulse Ox


 


 97.7 F   88   18   150/90   100 


 


 17 15:24  17 15:24  17 15:24  17 15:24  17 15:24














Medical Decision Making





- Medical Decision Making


A/P:  29 y/o female with left plantar fasciitis pain.  Plan is to discharge to 

home with work note and referral for Dr. Diop.  Instructed her to take 

Ibuprofen 3 times per day instead of 2.  Suggested she roll her affected foot 

on a tennis ball and get dr. Moreland's orthotics for temporary relief.  

Suggested she stretch multiple times per day and call Dr. Diop as soon as 

possible. 





The patient verbalizes understanding of all instructions, has no further 

questions and is awaiting discharge.














*DC/Admit/Observation/Transfer


Diagnosis at time of Disposition: 


 Plantar fasciitis of left foot





- Discharge Dispostion


Disposition: HOME


Condition at time of disposition: Good





- Referrals


Referrals: 


Mayco Diop MD [Staff Physician] - Call tomorrow





- Patient Instructions


Printed Discharge Instructions:  DI for Plantar Fasciitis


Additional Instructions: 


Discharge Instructions:


-Continue taking 800mg of Ibuprofen every 8 hours with food for pain. 


-Stretch your affected foot multiple times per day


-Roll your affected foot over a tennis ball to help with pain


-Follow up with Dr. Diop as soon as possible


-Try shoe orthotics to help with pain








- Post Discharge Activity


Work/School Note:  Back to Work Follow-up with urology. You were seen at [facility] ER for abdominal pain and low urine output due to a clog reyes. Your reyes was flushed and you had 700mL of urine out. Your vital signs and pain improved with relief of retention. You are to follow-up with your urologist (Dr. Harshal Gil) in the next week.

## 2017-07-17 ENCOUNTER — APPOINTMENT (OUTPATIENT)
Dept: PULMONOLOGY | Facility: CLINIC | Age: 77
End: 2017-07-17

## 2017-08-08 ENCOUNTER — APPOINTMENT (OUTPATIENT)
Dept: PULMONOLOGY | Facility: CLINIC | Age: 77
End: 2017-08-08
Payer: MEDICARE

## 2017-08-08 VITALS
SYSTOLIC BLOOD PRESSURE: 130 MMHG | DIASTOLIC BLOOD PRESSURE: 77 MMHG | WEIGHT: 187 LBS | HEIGHT: 67 IN | RESPIRATION RATE: 15 BRPM | BODY MASS INDEX: 29.35 KG/M2 | HEART RATE: 75 BPM | OXYGEN SATURATION: 100 % | TEMPERATURE: 98.1 F

## 2017-08-08 PROCEDURE — 99214 OFFICE O/P EST MOD 30 MIN: CPT

## 2017-08-24 ENCOUNTER — LABORATORY RESULT (OUTPATIENT)
Age: 77
End: 2017-08-24

## 2017-08-24 ENCOUNTER — APPOINTMENT (OUTPATIENT)
Dept: INTERNAL MEDICINE | Facility: CLINIC | Age: 77
End: 2017-08-24
Payer: MEDICARE

## 2017-08-24 VITALS
HEART RATE: 70 BPM | WEIGHT: 187 LBS | RESPIRATION RATE: 16 BRPM | HEIGHT: 67 IN | DIASTOLIC BLOOD PRESSURE: 64 MMHG | BODY MASS INDEX: 29.35 KG/M2 | OXYGEN SATURATION: 97 % | SYSTOLIC BLOOD PRESSURE: 136 MMHG | TEMPERATURE: 98.3 F

## 2017-08-24 DIAGNOSIS — Z85.038 PERSONAL HISTORY OF OTHER MALIGNANT NEOPLASM OF LARGE INTESTINE: ICD-10-CM

## 2017-08-24 DIAGNOSIS — E66.3 OVERWEIGHT: ICD-10-CM

## 2017-08-24 DIAGNOSIS — Z87.19 PERSONAL HISTORY OF OTHER DISEASES OF THE DIGESTIVE SYSTEM: ICD-10-CM

## 2017-08-24 DIAGNOSIS — R06.6 HICCOUGH: ICD-10-CM

## 2017-08-24 DIAGNOSIS — R09.02 HYPOXEMIA: ICD-10-CM

## 2017-08-24 DIAGNOSIS — Z01.818 ENCOUNTER FOR OTHER PREPROCEDURAL EXAMINATION: ICD-10-CM

## 2017-08-24 DIAGNOSIS — E55.9 VITAMIN D DEFICIENCY, UNSPECIFIED: ICD-10-CM

## 2017-08-24 PROCEDURE — 90715 TDAP VACCINE 7 YRS/> IM: CPT

## 2017-08-24 PROCEDURE — 90670 PCV13 VACCINE IM: CPT

## 2017-08-24 PROCEDURE — G0009: CPT

## 2017-08-24 PROCEDURE — 90472 IMMUNIZATION ADMIN EACH ADD: CPT

## 2017-08-24 PROCEDURE — 99214 OFFICE O/P EST MOD 30 MIN: CPT | Mod: 25

## 2017-08-24 PROCEDURE — 99204 OFFICE O/P NEW MOD 45 MIN: CPT | Mod: 25

## 2017-08-24 RX ORDER — CEPHALEXIN 500 MG/1
500 CAPSULE ORAL
Qty: 6 | Refills: 0 | Status: DISCONTINUED | COMMUNITY
Start: 2017-07-14 | End: 2017-08-24

## 2017-08-24 RX ORDER — FLUTICASONE FUROATE AND VILANTEROL TRIFENATATE 200; 25 UG/1; UG/1
200-25 POWDER RESPIRATORY (INHALATION)
Refills: 0 | Status: DISCONTINUED | COMMUNITY
Start: 2017-05-25 | End: 2017-08-24

## 2017-08-24 RX ORDER — ERGOCALCIFEROL 1.25 MG/1
1.25 MG CAPSULE, LIQUID FILLED ORAL
Refills: 0 | Status: DISCONTINUED | COMMUNITY
End: 2017-08-24

## 2017-08-24 RX ORDER — METOCLOPRAMIDE 5 MG/1
5 TABLET ORAL 4 TIMES DAILY
Qty: 60 | Refills: 1 | Status: DISCONTINUED | COMMUNITY
Start: 2017-07-17 | End: 2017-08-24

## 2017-08-24 RX ORDER — FLUTICASONE FUROATE AND VILANTEROL TRIFENATATE 200; 25 UG/1; UG/1
200-25 POWDER RESPIRATORY (INHALATION)
Qty: 1 | Refills: 3 | Status: DISCONTINUED | COMMUNITY
Start: 2017-04-25 | End: 2017-08-24

## 2017-08-24 RX ORDER — FLUTICASONE FUROATE AND VILANTEROL TRIFENATATE 200; 25 UG/1; UG/1
200-25 POWDER RESPIRATORY (INHALATION)
Refills: 0 | Status: DISCONTINUED | COMMUNITY
End: 2017-08-24

## 2017-08-24 RX ORDER — ENOXAPARIN SODIUM 150 MG/ML
120 INJECTION SUBCUTANEOUS
Refills: 0 | Status: DISCONTINUED | COMMUNITY
End: 2017-08-24

## 2017-08-24 RX ORDER — OXYCODONE AND ACETAMINOPHEN 5; 325 MG/1; MG/1
5-325 TABLET ORAL
Qty: 12 | Refills: 0 | Status: DISCONTINUED | COMMUNITY
Start: 2017-07-14 | End: 2017-08-24

## 2017-08-24 RX ORDER — MOMETASONE FUROATE AND FORMOTEROL FUMARATE DIHYDRATE 200; 5 UG/1; UG/1
200-5 AEROSOL RESPIRATORY (INHALATION)
Refills: 0 | Status: DISCONTINUED | COMMUNITY
Start: 2017-05-25 | End: 2017-08-24

## 2017-08-24 RX ORDER — AMLODIPINE BESYLATE 5 MG/1
5 TABLET ORAL
Qty: 180 | Refills: 3 | Status: DISCONTINUED | COMMUNITY
End: 2017-08-24

## 2017-08-25 PROBLEM — R06.6 HICCOUGHS: Status: RESOLVED | Noted: 2017-07-17 | Resolved: 2017-08-25

## 2017-08-25 PROBLEM — E55.9 LOW VITAMIN D LEVEL: Status: RESOLVED | Noted: 2017-04-18 | Resolved: 2017-08-25

## 2017-08-25 PROBLEM — E66.3 OVERWEIGHT: Status: ACTIVE | Noted: 2017-08-25

## 2017-08-25 PROBLEM — Z01.818 PREOPERATIVE EVALUATION OF A MEDICAL CONDITION TO RULE OUT SURGICAL CONTRAINDICATIONS (TAR REQUIRED): Status: RESOLVED | Noted: 2017-04-25 | Resolved: 2017-08-25

## 2017-08-25 PROBLEM — R09.02 HYPOXEMIA: Status: RESOLVED | Noted: 2017-04-18 | Resolved: 2017-08-25

## 2017-08-25 PROBLEM — Z87.19 HISTORY OF UMBILICAL HERNIA: Status: RESOLVED | Noted: 2017-08-25 | Resolved: 2017-08-25

## 2017-08-30 ENCOUNTER — APPOINTMENT (OUTPATIENT)
Dept: VASCULAR SURGERY | Facility: CLINIC | Age: 77
End: 2017-08-30
Payer: MEDICARE

## 2017-08-30 VITALS
HEART RATE: 90 BPM | HEIGHT: 67 IN | TEMPERATURE: 97.9 F | BODY MASS INDEX: 29.35 KG/M2 | DIASTOLIC BLOOD PRESSURE: 81 MMHG | WEIGHT: 187 LBS | SYSTOLIC BLOOD PRESSURE: 135 MMHG

## 2017-08-30 PROCEDURE — 99213 OFFICE O/P EST LOW 20 MIN: CPT

## 2017-09-01 LAB
ALBUMIN SERPL ELPH-MCNC: 4.4 G/DL
ALP BLD-CCNC: 81 U/L
ALT SERPL-CCNC: 18 U/L
ANION GAP SERPL CALC-SCNC: 17 MMOL/L
APPEARANCE: ABNORMAL
AST SERPL-CCNC: 14 U/L
BASOPHILS # BLD AUTO: 0.01 K/UL
BASOPHILS NFR BLD AUTO: 0.2 %
BILIRUB SERPL-MCNC: 0.3 MG/DL
BILIRUBIN URINE: NEGATIVE
BLOOD URINE: ABNORMAL
BUN SERPL-MCNC: 16 MG/DL
CALCIUM SERPL-MCNC: 9.6 MG/DL
CHLORIDE SERPL-SCNC: 105 MMOL/L
CHOLEST SERPL-MCNC: 167 MG/DL
CHOLEST/HDLC SERPL: 3.6 RATIO
CO2 SERPL-SCNC: 21 MMOL/L
COLOR: ABNORMAL
CREAT SERPL-MCNC: 1.06 MG/DL
EOSINOPHIL # BLD AUTO: 0.1 K/UL
EOSINOPHIL NFR BLD AUTO: 2.1 %
GLUCOSE QUALITATIVE U: NORMAL MG/DL
GLUCOSE SERPL-MCNC: 96 MG/DL
HBA1C MFR BLD HPLC: 5.8 %
HCT VFR BLD CALC: 43.1 %
HDLC SERPL-MCNC: 47 MG/DL
HGB BLD-MCNC: 13.4 G/DL
IMM GRANULOCYTES NFR BLD AUTO: 0.4 %
KETONES URINE: ABNORMAL
LDLC SERPL CALC-MCNC: 97 MG/DL
LEUKOCYTE ESTERASE URINE: ABNORMAL
LYMPHOCYTES # BLD AUTO: 1.6 K/UL
LYMPHOCYTES NFR BLD AUTO: 33.2 %
MAN DIFF?: NORMAL
MCHC RBC-ENTMCNC: 27 PG
MCHC RBC-ENTMCNC: 31.1 GM/DL
MCV RBC AUTO: 86.9 FL
MONOCYTES # BLD AUTO: 0.51 K/UL
MONOCYTES NFR BLD AUTO: 10.6 %
NEUTROPHILS # BLD AUTO: 2.58 K/UL
NEUTROPHILS NFR BLD AUTO: 53.5 %
NITRITE URINE: NEGATIVE
PH URINE: 5.5
PLATELET # BLD AUTO: 248 K/UL
POTASSIUM SERPL-SCNC: 4.6 MMOL/L
PROT SERPL-MCNC: 7.8 G/DL
PROTEIN URINE: 100 MG/DL
PSA SERPL-MCNC: 0.65 NG/ML
RBC # BLD: 4.96 M/UL
RBC # FLD: 14.6 %
SODIUM SERPL-SCNC: 143 MMOL/L
SPECIFIC GRAVITY URINE: 1.02
TRIGL SERPL-MCNC: 117 MG/DL
TSH SERPL-ACNC: 1.33 UIU/ML
UROBILINOGEN URINE: NORMAL MG/DL
WBC # FLD AUTO: 4.82 K/UL

## 2017-09-07 ENCOUNTER — OTHER (OUTPATIENT)
Age: 77
End: 2017-09-07

## 2017-09-08 NOTE — PROGRESS NOTE ADULT - PROBLEM SELECTOR PROBLEM 2
Prostate cancer
Quality 224: Stage 0-Iic Melanoma: Overutilization Of Imaging Studies For Only Stage 0-Iic Melanoma: None of the following diagnostic imaging studies ordered: chest X-ray, CT, Ultrasound, MRI, PET, or nuclear medicine scans (ML)
PE (pulmonary thromboembolism)
PE (pulmonary thromboembolism)
Detail Level: Generalized
Include Location In Plan?: No
Detail Level: Detailed
Quality 137: Melanoma: Continuity Of Care - Recall System: Patient information entered into a recall system that includes: target date for the next exam specified AND a process to follow up with patients regarding missed or unscheduled appointments

## 2017-09-11 ENCOUNTER — OUTPATIENT (OUTPATIENT)
Dept: OUTPATIENT SERVICES | Facility: HOSPITAL | Age: 77
LOS: 1 days | End: 2017-09-11
Payer: MEDICARE

## 2017-09-11 DIAGNOSIS — Z98.890 OTHER SPECIFIED POSTPROCEDURAL STATES: Chronic | ICD-10-CM
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
## 2017-09-11 DIAGNOSIS — I82.409 ACUTE EMBOLISM AND THROMBOSIS OF UNSPECIFIED DEEP VEINS OF UNSPECIFIED LOWER EXTREMITY: ICD-10-CM

## 2017-09-11 DIAGNOSIS — D29.22 BENIGN NEOPLASM OF LEFT TESTIS: Chronic | ICD-10-CM

## 2017-09-11 DIAGNOSIS — Z95.828 PRESENCE OF OTHER VASCULAR IMPLANTS AND GRAFTS: Chronic | ICD-10-CM

## 2017-09-11 LAB
ALBUMIN SERPL ELPH-MCNC: 4.5 G/DL — SIGNIFICANT CHANGE UP (ref 3.3–5)
ALP SERPL-CCNC: 74 U/L — SIGNIFICANT CHANGE UP (ref 40–120)
ALT FLD-CCNC: 16 U/L RC — SIGNIFICANT CHANGE UP (ref 10–45)
ANION GAP SERPL CALC-SCNC: 14 MMOL/L — SIGNIFICANT CHANGE UP (ref 5–17)
AST SERPL-CCNC: 16 U/L — SIGNIFICANT CHANGE UP (ref 10–40)
BILIRUB SERPL-MCNC: 0.3 MG/DL — SIGNIFICANT CHANGE UP (ref 0.2–1.2)
BUN SERPL-MCNC: 15 MG/DL — SIGNIFICANT CHANGE UP (ref 7–23)
CALCIUM SERPL-MCNC: 9.8 MG/DL — SIGNIFICANT CHANGE UP (ref 8.4–10.5)
CHLORIDE SERPL-SCNC: 103 MMOL/L — SIGNIFICANT CHANGE UP (ref 96–108)
CO2 SERPL-SCNC: 23 MMOL/L — SIGNIFICANT CHANGE UP (ref 22–31)
CREAT SERPL-MCNC: 1.18 MG/DL — SIGNIFICANT CHANGE UP (ref 0.5–1.3)
GLUCOSE SERPL-MCNC: 112 MG/DL — HIGH (ref 70–99)
HCT VFR BLD CALC: 43.5 % — SIGNIFICANT CHANGE UP (ref 39–50)
HGB BLD-MCNC: 13.9 G/DL — SIGNIFICANT CHANGE UP (ref 13–17)
MCHC RBC-ENTMCNC: 28.1 PG — SIGNIFICANT CHANGE UP (ref 27–34)
MCHC RBC-ENTMCNC: 31.9 GM/DL — LOW (ref 32–36)
MCV RBC AUTO: 88.1 FL — SIGNIFICANT CHANGE UP (ref 80–100)
PLATELET # BLD AUTO: 209 K/UL — SIGNIFICANT CHANGE UP (ref 150–400)
POTASSIUM SERPL-MCNC: 4.7 MMOL/L — SIGNIFICANT CHANGE UP (ref 3.5–5.3)
POTASSIUM SERPL-SCNC: 4.7 MMOL/L — SIGNIFICANT CHANGE UP (ref 3.5–5.3)
PROT SERPL-MCNC: 8.1 G/DL — SIGNIFICANT CHANGE UP (ref 6–8.3)
RBC # BLD: 4.94 M/UL — SIGNIFICANT CHANGE UP (ref 4.2–5.8)
RBC # FLD: 12.7 % — SIGNIFICANT CHANGE UP (ref 10.3–14.5)
SODIUM SERPL-SCNC: 140 MMOL/L — SIGNIFICANT CHANGE UP (ref 135–145)
WBC # BLD: 5.1 K/UL — SIGNIFICANT CHANGE UP (ref 3.8–10.5)
WBC # FLD AUTO: 5.1 K/UL — SIGNIFICANT CHANGE UP (ref 3.8–10.5)

## 2017-09-11 PROCEDURE — 85027 COMPLETE CBC AUTOMATED: CPT

## 2017-09-11 PROCEDURE — 37193 REM ENDOVAS VENA CAVA FILTER: CPT

## 2017-09-11 PROCEDURE — C1773: CPT

## 2017-09-11 PROCEDURE — C1769: CPT

## 2017-09-11 PROCEDURE — C1894: CPT

## 2017-09-11 PROCEDURE — 75827 VEIN X-RAY CHEST: CPT | Mod: 26,59

## 2017-09-11 PROCEDURE — 93005 ELECTROCARDIOGRAM TRACING: CPT

## 2017-09-11 PROCEDURE — 93010 ELECTROCARDIOGRAM REPORT: CPT

## 2017-09-11 PROCEDURE — 80053 COMPREHEN METABOLIC PANEL: CPT

## 2017-09-11 RX ORDER — APIXABAN 2.5 MG/1
1 TABLET, FILM COATED ORAL
Qty: 0 | Refills: 0 | COMMUNITY

## 2017-09-11 RX ORDER — MOMETASONE FUROATE AND FORMOTEROL FUMARATE DIHYDRATE 200; 5 UG/1; UG/1
1 AEROSOL RESPIRATORY (INHALATION)
Qty: 0 | Refills: 0 | COMMUNITY

## 2017-09-11 RX ORDER — AMLODIPINE BESYLATE 2.5 MG/1
1 TABLET ORAL
Qty: 0 | Refills: 0 | COMMUNITY

## 2017-09-11 RX ORDER — LOSARTAN POTASSIUM 100 MG/1
1 TABLET, FILM COATED ORAL
Qty: 0 | Refills: 0 | COMMUNITY

## 2017-10-04 ENCOUNTER — APPOINTMENT (OUTPATIENT)
Dept: VASCULAR SURGERY | Facility: CLINIC | Age: 77
End: 2017-10-04
Payer: MEDICARE

## 2017-10-04 VITALS
HEART RATE: 80 BPM | TEMPERATURE: 98 F | DIASTOLIC BLOOD PRESSURE: 80 MMHG | WEIGHT: 187 LBS | SYSTOLIC BLOOD PRESSURE: 138 MMHG | HEIGHT: 67 IN | BODY MASS INDEX: 29.35 KG/M2

## 2017-10-04 PROCEDURE — 99213 OFFICE O/P EST LOW 20 MIN: CPT

## 2017-10-10 ENCOUNTER — APPOINTMENT (OUTPATIENT)
Dept: PULMONOLOGY | Facility: CLINIC | Age: 77
End: 2017-10-10
Payer: MEDICARE

## 2017-10-10 VITALS
HEART RATE: 91 BPM | TEMPERATURE: 97.9 F | WEIGHT: 190 LBS | SYSTOLIC BLOOD PRESSURE: 125 MMHG | DIASTOLIC BLOOD PRESSURE: 86 MMHG | BODY MASS INDEX: 29.82 KG/M2 | OXYGEN SATURATION: 96 % | HEIGHT: 67 IN | RESPIRATION RATE: 15 BRPM

## 2017-10-10 PROCEDURE — 99214 OFFICE O/P EST MOD 30 MIN: CPT

## 2017-11-02 ENCOUNTER — APPOINTMENT (OUTPATIENT)
Dept: INTERNAL MEDICINE | Facility: CLINIC | Age: 77
End: 2017-11-02
Payer: MEDICARE

## 2017-11-02 VITALS
RESPIRATION RATE: 14 BRPM | DIASTOLIC BLOOD PRESSURE: 60 MMHG | WEIGHT: 195 LBS | TEMPERATURE: 98 F | OXYGEN SATURATION: 96 % | HEART RATE: 88 BPM | BODY MASS INDEX: 30.61 KG/M2 | SYSTOLIC BLOOD PRESSURE: 120 MMHG | HEIGHT: 67 IN

## 2017-11-02 DIAGNOSIS — D01.0 CARCINOMA IN SITU OF COLON: ICD-10-CM

## 2017-11-02 DIAGNOSIS — C61 MALIGNANT NEOPLASM OF PROSTATE: ICD-10-CM

## 2017-11-02 PROCEDURE — 99215 OFFICE O/P EST HI 40 MIN: CPT | Mod: 25

## 2017-11-02 PROCEDURE — 36415 COLL VENOUS BLD VENIPUNCTURE: CPT

## 2017-11-03 LAB
ANION GAP SERPL CALC-SCNC: 16 MMOL/L
BUN SERPL-MCNC: 18 MG/DL
CALCIUM SERPL-MCNC: 10 MG/DL
CHLORIDE SERPL-SCNC: 101 MMOL/L
CO2 SERPL-SCNC: 23 MMOL/L
CREAT SERPL-MCNC: 1.3 MG/DL
CREAT SPEC-SCNC: 136 MG/DL
CREAT/PROT UR: 0.6 RATIO
GLUCOSE SERPL-MCNC: 134 MG/DL
HBA1C MFR BLD HPLC: 6.1 %
POTASSIUM SERPL-SCNC: 4.9 MMOL/L
PROT UR-MCNC: 84 MG/DL
SODIUM SERPL-SCNC: 140 MMOL/L

## 2017-11-22 ENCOUNTER — LABORATORY RESULT (OUTPATIENT)
Age: 77
End: 2017-11-22

## 2017-11-22 ENCOUNTER — APPOINTMENT (OUTPATIENT)
Dept: HEMATOLOGY ONCOLOGY | Facility: CLINIC | Age: 77
End: 2017-11-22
Payer: MEDICARE

## 2017-11-22 VITALS
DIASTOLIC BLOOD PRESSURE: 70 MMHG | OXYGEN SATURATION: 97 % | SYSTOLIC BLOOD PRESSURE: 120 MMHG | HEIGHT: 67 IN | WEIGHT: 196 LBS | HEART RATE: 91 BPM | TEMPERATURE: 97.3 F | BODY MASS INDEX: 30.76 KG/M2

## 2017-11-22 PROCEDURE — 99204 OFFICE O/P NEW MOD 45 MIN: CPT

## 2017-11-24 ENCOUNTER — MOBILE ON CALL (OUTPATIENT)
Age: 77
End: 2017-11-24

## 2017-11-28 LAB — B2 GLYCOPROT1 AB SER QL: POSITIVE

## 2017-12-06 ENCOUNTER — RX RENEWAL (OUTPATIENT)
Age: 77
End: 2017-12-06

## 2018-02-14 ENCOUNTER — APPOINTMENT (OUTPATIENT)
Dept: HEMATOLOGY ONCOLOGY | Facility: CLINIC | Age: 78
End: 2018-02-14

## 2018-03-07 ENCOUNTER — APPOINTMENT (OUTPATIENT)
Dept: VASCULAR SURGERY | Facility: CLINIC | Age: 78
End: 2018-03-07

## 2018-03-08 ENCOUNTER — APPOINTMENT (OUTPATIENT)
Dept: VASCULAR SURGERY | Facility: CLINIC | Age: 78
End: 2018-03-08

## 2018-05-08 ENCOUNTER — APPOINTMENT (OUTPATIENT)
Dept: INTERNAL MEDICINE | Facility: CLINIC | Age: 78
End: 2018-05-08
Payer: MEDICARE

## 2018-05-08 ENCOUNTER — APPOINTMENT (OUTPATIENT)
Dept: VASCULAR SURGERY | Facility: CLINIC | Age: 78
End: 2018-05-08

## 2018-05-08 VITALS
SYSTOLIC BLOOD PRESSURE: 140 MMHG | DIASTOLIC BLOOD PRESSURE: 80 MMHG | HEIGHT: 67 IN | HEART RATE: 97 BPM | OXYGEN SATURATION: 97 % | RESPIRATION RATE: 14 BRPM | BODY MASS INDEX: 31.08 KG/M2 | TEMPERATURE: 98.4 F | WEIGHT: 198 LBS

## 2018-05-08 DIAGNOSIS — D68.59 OTHER PRIMARY THROMBOPHILIA: ICD-10-CM

## 2018-05-08 DIAGNOSIS — C61 MALIGNANT NEOPLASM OF PROSTATE: ICD-10-CM

## 2018-05-08 DIAGNOSIS — J45.909 UNSPECIFIED ASTHMA, UNCOMPLICATED: ICD-10-CM

## 2018-05-08 PROCEDURE — 99215 OFFICE O/P EST HI 40 MIN: CPT | Mod: 25

## 2018-05-08 PROCEDURE — 36415 COLL VENOUS BLD VENIPUNCTURE: CPT

## 2018-05-09 LAB
ANION GAP SERPL CALC-SCNC: 16 MMOL/L
BASOPHILS # BLD AUTO: 0.01 K/UL
BASOPHILS NFR BLD AUTO: 0.2 %
BUN SERPL-MCNC: 9 MG/DL
CALCIUM SERPL-MCNC: 9.3 MG/DL
CHLORIDE SERPL-SCNC: 104 MMOL/L
CO2 SERPL-SCNC: 22 MMOL/L
CREAT SERPL-MCNC: 0.97 MG/DL
EOSINOPHIL # BLD AUTO: 0.06 K/UL
EOSINOPHIL NFR BLD AUTO: 1.4 %
GLUCOSE SERPL-MCNC: 107 MG/DL
HBA1C MFR BLD HPLC: 6 %
HCT VFR BLD CALC: 42.2 %
HGB BLD-MCNC: 13.5 G/DL
IMM GRANULOCYTES NFR BLD AUTO: 0.2 %
LYMPHOCYTES # BLD AUTO: 1.76 K/UL
LYMPHOCYTES NFR BLD AUTO: 41.8 %
MAN DIFF?: NORMAL
MCHC RBC-ENTMCNC: 27.5 PG
MCHC RBC-ENTMCNC: 32 GM/DL
MCV RBC AUTO: 85.9 FL
MONOCYTES # BLD AUTO: 0.34 K/UL
MONOCYTES NFR BLD AUTO: 8.1 %
NEUTROPHILS # BLD AUTO: 2.03 K/UL
NEUTROPHILS NFR BLD AUTO: 48.3 %
PLATELET # BLD AUTO: 231 K/UL
POTASSIUM SERPL-SCNC: 4.7 MMOL/L
RBC # BLD: 4.91 M/UL
RBC # FLD: 15.6 %
SODIUM SERPL-SCNC: 142 MMOL/L
WBC # FLD AUTO: 4.21 K/UL

## 2018-05-25 NOTE — ASU PREOP CHECKLIST - NOTHING BY MOUTH SINCE
71 yo F with HTN and HLD who is transferred to [facility] from [facility] with subarachnoid hemorrhage noted on CT Head. Patient went to IR with coioling of L pcomm aneurysm    Antithrombotics for secondary stroke prevention: Antiplatelets: None: SAH    Statins for secondary stroke prevention and hyperlipidemia, if present:   Statins: Atorvastatin- 40 mg daily    Aggressive risk factor modification: HTN, DM     Rehab efforts: Occupational Therapy, PT/OT/SLP to evaluate and treat    Diagnostics ordered/pending: TTE to assess cardiac function/status     VTE prophylaxis: None: Reason for No Pharmacological VTE Prophylaxis: History of systemic or intracranial bleeding    BP parameters: SAH: Secured aneurysm, no target, increase BP to prevent vasospasm if needed        12-Jul-2017 22:00

## 2018-05-30 ENCOUNTER — APPOINTMENT (OUTPATIENT)
Dept: VASCULAR SURGERY | Facility: CLINIC | Age: 78
End: 2018-05-30
Payer: MEDICARE

## 2018-05-30 VITALS
SYSTOLIC BLOOD PRESSURE: 136 MMHG | HEIGHT: 67 IN | HEART RATE: 89 BPM | BODY MASS INDEX: 31.08 KG/M2 | WEIGHT: 198 LBS | DIASTOLIC BLOOD PRESSURE: 82 MMHG | TEMPERATURE: 97.9 F

## 2018-05-30 PROCEDURE — 99213 OFFICE O/P EST LOW 20 MIN: CPT

## 2018-07-16 PROBLEM — I26.99 OTHER PULMONARY EMBOLISM WITHOUT ACUTE COR PULMONALE: Chronic | Status: ACTIVE | Noted: 2017-04-03

## 2018-07-16 PROBLEM — C61 MALIGNANT NEOPLASM OF PROSTATE: Chronic | Status: ACTIVE | Noted: 2017-02-01

## 2018-08-29 ENCOUNTER — LABORATORY RESULT (OUTPATIENT)
Age: 78
End: 2018-08-29

## 2018-08-29 ENCOUNTER — APPOINTMENT (OUTPATIENT)
Dept: INTERNAL MEDICINE | Facility: CLINIC | Age: 78
End: 2018-08-29
Payer: MEDICARE

## 2018-08-29 VITALS
SYSTOLIC BLOOD PRESSURE: 130 MMHG | OXYGEN SATURATION: 96 % | BODY MASS INDEX: 31.23 KG/M2 | RESPIRATION RATE: 16 BRPM | TEMPERATURE: 98.4 F | WEIGHT: 199 LBS | HEIGHT: 67 IN | HEART RATE: 96 BPM | DIASTOLIC BLOOD PRESSURE: 74 MMHG

## 2018-08-29 DIAGNOSIS — R73.03 PREDIABETES.: ICD-10-CM

## 2018-08-29 DIAGNOSIS — N18.2 CHRONIC KIDNEY DISEASE, STAGE 2 (MILD): ICD-10-CM

## 2018-08-29 DIAGNOSIS — M25.512 PAIN IN LEFT SHOULDER: ICD-10-CM

## 2018-08-29 DIAGNOSIS — Z00.00 ENCOUNTER FOR GENERAL ADULT MEDICAL EXAMINATION W/OUT ABNORMAL FINDINGS: ICD-10-CM

## 2018-08-29 DIAGNOSIS — I82.409 ACUTE EMBOLISM AND THROMBOSIS OF UNSPECIFIED DEEP VEINS OF UNSPECIFIED LOWER EXTREMITY: ICD-10-CM

## 2018-08-29 DIAGNOSIS — I26.99 OTHER PULMONARY EMBOLISM W/OUT ACUTE COR PULMONALE: ICD-10-CM

## 2018-08-29 DIAGNOSIS — I10 ESSENTIAL (PRIMARY) HYPERTENSION: ICD-10-CM

## 2018-08-29 DIAGNOSIS — Z87.891 PERSONAL HISTORY OF NICOTINE DEPENDENCE: ICD-10-CM

## 2018-08-29 DIAGNOSIS — R80.9 PROTEINURIA, UNSPECIFIED: ICD-10-CM

## 2018-08-29 DIAGNOSIS — E55.9 VITAMIN D DEFICIENCY, UNSPECIFIED: ICD-10-CM

## 2018-08-29 PROCEDURE — G0009: CPT

## 2018-08-29 PROCEDURE — 36415 COLL VENOUS BLD VENIPUNCTURE: CPT

## 2018-08-29 PROCEDURE — G0439: CPT | Mod: 25

## 2018-08-29 PROCEDURE — 90732 PPSV23 VACC 2 YRS+ SUBQ/IM: CPT

## 2018-08-29 RX ORDER — LOSARTAN POTASSIUM 100 MG/1
100 TABLET, FILM COATED ORAL DAILY
Qty: 90 | Refills: 1 | Status: ACTIVE | COMMUNITY
Start: 2017-04-25 | End: 1900-01-01

## 2018-08-29 RX ORDER — CHROMIUM 200 MCG
1000 TABLET ORAL DAILY
Qty: 90 | Refills: 1 | Status: ACTIVE | COMMUNITY
Start: 2017-11-02 | End: 1900-01-01

## 2018-08-29 RX ORDER — APIXABAN 5 MG/1
5 TABLET, FILM COATED ORAL
Qty: 180 | Refills: 1 | Status: ACTIVE | COMMUNITY
Start: 2017-08-08 | End: 1900-01-01

## 2018-08-29 RX ORDER — AMLODIPINE BESYLATE 5 MG/1
5 TABLET ORAL
Qty: 180 | Refills: 3 | Status: ACTIVE | COMMUNITY
Start: 2017-04-25 | End: 1900-01-01

## 2018-08-29 NOTE — PHYSICAL EXAM
[General Appearance - Alert] : alert [General Appearance - In No Acute Distress] : in no acute distress [General Appearance - Well-Appearing] : healthy appearing [Sclera] : the sclera and conjunctiva were normal [PERRL With Normal Accommodation] : pupils were equal in size, round, and reactive to light [Extraocular Movements] : extraocular movements were intact [Outer Ear] : the ears and nose were normal in appearance [Nasal Cavity] : the nasal mucosa and septum were normal [Oropharynx] : the oropharynx was normal [Neck Appearance] : the appearance of the neck was normal [Thyroid Diffuse Enlargement] : the thyroid was not enlarged [Respiration, Rhythm And Depth] : normal respiratory rhythm and effort [Auscultation Breath Sounds / Voice Sounds] : lungs were clear to auscultation bilaterally [Heart Rate And Rhythm] : heart rate was normal and rhythm regular [Heart Sounds] : normal S1 and S2 [Murmurs] : no murmurs [Full Pulse] : the pedal pulses are present [Edema] : there was no peripheral edema [Bowel Sounds] : normal bowel sounds [Abdomen Soft] : soft [Abdomen Tenderness] : non-tender [Cervical Lymph Nodes Enlarged Posterior Bilaterally] : posterior cervical [Cervical Lymph Nodes Enlarged Anterior Bilaterally] : anterior cervical [Supraclavicular Lymph Nodes Enlarged Bilaterally] : supraclavicular [No CVA Tenderness] : no ~M costovertebral angle tenderness [No Spinal Tenderness] : no spinal tenderness [Abnormal Walk] : normal gait [Musculoskeletal - Swelling] : no joint swelling seen [No Focal Deficits] : no focal deficits [Oriented To Time, Place, And Person] : oriented to person, place, and time [Affect] : the affect was normal [Mood] : the mood was normal [Thyroid Nodule] : there were no palpable thyroid nodules [] : no hepato-splenomegaly [FreeTextEntry1] : scattered freckles

## 2018-08-29 NOTE — HEALTH RISK ASSESSMENT
[0] : 2) Feeling down, depressed, or hopeless: Not at all (0) [HIV Test offered] : HIV Test offered [No falls in past year] : Patient reported no falls in the past year [Fully functional (bathing, dressing, toileting, transferring, walking, feeding)] : Fully functional (bathing, dressing, toileting, transferring, walking, feeding) [Fully functional (using the telephone, shopping, preparing meals, housekeeping, doing laundry, using] : Fully functional and needs no help or supervision to perform IADLs (using the telephone, shopping, preparing meals, housekeeping, doing laundry, using transportation, managing medications and managing finances) [Discussed at today's visit] : Advance Directives Discussed at today's visit [ColonoscopyDate] : 7/17

## 2018-08-29 NOTE — REVIEW OF SYSTEMS
[see HPI] : see HPI [Negative] : Musculoskeletal [Fever] : no fever [Chills] : no chills [Feeling Poorly] : not feeling poorly [Feeling Tired] : not feeling tired [Chest Pain] : no chest pain [Palpitations] : no palpitations [Lower Ext Edema] : no extremity edema [Shortness Of Breath] : no shortness of breath [Cough] : no cough [Wheezing] : no wheezing [SOB on Exertion] : no shortness of breath during exertion [Abdominal Pain] : no abdominal pain [Constipation] : no constipation [Diarrhea] : no diarrhea [Melena] : no melena [Dysuria] : no dysuria [Dizziness] : no dizziness [Limb Weakness] : no limb weakness

## 2018-08-29 NOTE — HISTORY OF PRESENT ILLNESS
[Health Maintenance] : health maintenance [___ Year(s) Ago] : [unfilled] year(s) ago [Spouse] : spouse [Former Cigarette Smoker] : is a former cigarette smoker [Quit Cigarettes: ___] : quit smoking [unfilled] [Occasional Use] : occasional alcohol use [Never] : has never used illicit drugs [Good] : good [Reg. Dental Visits] : He has regular dental visits [Binge Drinking] : denies binge drinking [Patient Concern] : no personal concern about alcohol use [Family Concern] : no family concern about alcohol use [Vision Problems] : He denies vision problems [Hearing Loss] : He denies hearing loss [de-identified] : retired [de-identified] : declines flu shot, hx Tdap 8/17, hx prevnar 8/17, no hx PVX, no hx shingles vaccine [FreeTextEntry1] : \par Feeling well today.\par Needs med refills.\par Fasting for labs.\par \par \par States relocating to [place] in 2 weeks, does not plan to return to [place].\par \par \par HTN-\par -followed by cardio, Dr. Jesus- states seen 6/18, told doing okay and no med changes made. \par -on losartan 100mg and norvasc 5mg qd - adherent\par -eats low salt diet\par -no formal exercise, but stays active daily\par -denies dizziness, CP, sob, palpitations or leg edema\par -hx nl optho eval 2014 in [place] when had f/u after b/l cataracts, no f/u since in [place] bu has appt in [place] 9/18.  \par \par hx asthma, COPD-\par -former smoker, quit 45 yrs ago, smoked 1/2 ppd [age] yo\par -followed by pulm\par -hx Breo/dulera pumps, stopped as advised not needed \par -denies cough, wheezing or sob.\par \par hx PEs in 2/17 of unclear etiology (had dizziness/sob for which went to [facility]- had CT showing b/l PEs per pt)- d/c'd to f/u with cardio and pulm as outpt per pt.  Notes hx getting lovenox up until 7/17, changed to eliquis 8/8/17 by pulm.\par -s/p IVC filter 6/17 (placed pre-op for  surgery), removed 10/10/17 by vascular.\par -followed by vascular, Dr. Johnston- seen 5/18 \par -followed by pulm, seen 10/17- s/p CT angio negative for clots\par -seen by hypercoagulable specialist, Dr. Sanchez 11/17- advised to continue Eliquis indefinitely.  Reports Rx adherence\par -denies clinical bleeding.\par \par hx prostate ca- s/p TURP 7/13/17, told in remission.  Denies hx chemo or xrt.\par -followed by , Dr. Gil- has f/u 8/31/18\par -denies  complaints.\par -denies dysuria, frequency, hesitancy or hematuria\par \par hx colon ca - dx'd while living in [place] in 2012 on routine c-scope, told to monitor only at that time.  Did not f/u after 6 mo as moved to [place].  \par -following with Dr. Monreal (rad-onc) since 2014- had f/u 1/18\par -hx colonoscopy 7/17- states told "all looked good" \par -reports nl appetite and BMs; denies BRBPR or melena.  Reports stable weight.\par \par Denies depression or anxiety.\par \par Independent with ADLs\par driving w/o issues\par denies hx falls or balance issues\par \par

## 2018-08-29 NOTE — ASSESSMENT
[FreeTextEntry1] : \par preDM- 5/18 A1c- 6.0 (was 6.1)\par -ADA diet, exercise and wt loss advised\par -check A1c\par \par HLD- 8/17 Tchol 167  LDL 97 HDL 47, ASCV risk > 7.5\par -advised statin for CV risk reduction, declines.\par -low fat diet, exercise and wt loss advised\par -check lipids\par \par HTN-\par -followed by cardio, Dr. Jesus- states seen 6/18, told doing okay and no med changes made.\par -on losartan and norvasc\par -8/17 cmp wnl, screening UA +prt--> 11/17 prt/crt + Referred to nephrology- eval pending.  Check UA/prt:crt today.\par -optho referral for screening- pending, has appt 9/18 in [place] per pt\par -low salt diet advised\par -advised to avoid NSAIDs\par -Asked to forward records.\par \par hx asthma, COPD- asx\par -former smoker, quit 45 yrs ago, smoked 1/2 ppd [age] yo.  Cont'd cessation encouraged.\par -followed by pulm\par -hx Breo/dulera pumps, stopped as advised not needed\par \par hx PEs in 2/17 (of unclear etiology, possibly 2/2 hx cancer)-s/p IVC filter 6/17 (placed pre-op for  surgery); asx\par -hx Lovenox tx up until 7/17, changed to Eliquis 8/8/17 by pulm.\par -5/17 LE duplex: patent IVC and iliac veins\par -6/17 CT A/P: no pelvic deep vein clots, large umbilical hernia\par -followed by vascular, Dr. Johnston- seen 5/18 \par -followed by pulm, seen 10/17 s/p CT angio negative for clots\par -seen by hypercoagulable specialist, Dr. Alarcon 11/17- advised to continue Eliquis indefinitely.\par -advised to avoid NSAID use due to concurrent bleeding risk with Eliquis\par -advised of need for prompt medical evaluation if clinical bleeding noted\par -5/18 cbc wnl\par \par hx prostate ca- s/p TURP 7/13/17, told in remission.  Denies hx chemo or xrt.  Asx.\par -followed by Dr. Gil ([phone])- has f/u 8/31/18\par -8/17 PSA wnl\par -Asked to forward medical records.\par \par hx colon ca - dx'd while living in [place] in 2012 on routing c-scope; hx xrt (finished 2016), no hx surgery or chemo; asx. \par -followed by rad-onc, Dr. Monreal ([phone]) - had f/u 1/18\par -hx colonoscopy 7/17- "states told all looked good" per pt\par -Asked to forward records.\par \par vit d insuff-\par -on vit d 1000U/d\par -check level\par \par overweight-\par -healthy eating, exercise and wt loss counseled\par \par \par HCM\par -check screening labs; declines HIV/STD screening\par -declines flu shots\par -hx Tdap 8/17\par -hx prevnar 8/17\par -for PVX today\par -advised to check on insurance coverage for shingrix vaccine and consider receiving\par -yearly dental screening advised\par -yearly derm screening and regular use of sun block for skin cancer prevention counseled.\par -Advised to designate HCP\par \par \par Pt's cell: [phone]\par Pt states relocating and will find new PMD in [place].  Advised to contact office and have records forwarded to new provider.

## 2018-09-05 DIAGNOSIS — E55.9 VITAMIN D DEFICIENCY, UNSPECIFIED: ICD-10-CM

## 2018-09-05 LAB
25(OH)D3 SERPL-MCNC: 16.7 NG/ML
ALBUMIN SERPL ELPH-MCNC: 4.7 G/DL
ALP BLD-CCNC: 80 U/L
ALT SERPL-CCNC: 25 U/L
ANION GAP SERPL CALC-SCNC: 13 MMOL/L
APPEARANCE: CLEAR
AST SERPL-CCNC: 19 U/L
BASOPHILS # BLD AUTO: 0.01 K/UL
BASOPHILS NFR BLD AUTO: 0.2 %
BILIRUB SERPL-MCNC: 0.3 MG/DL
BILIRUBIN URINE: NEGATIVE
BLOOD URINE: NEGATIVE
BUN SERPL-MCNC: 11 MG/DL
CALCIUM SERPL-MCNC: 9.3 MG/DL
CHLORIDE SERPL-SCNC: 106 MMOL/L
CHOLEST SERPL-MCNC: 166 MG/DL
CHOLEST/HDLC SERPL: 4 RATIO
CO2 SERPL-SCNC: 23 MMOL/L
COLOR: YELLOW
CREAT SERPL-MCNC: 1.11 MG/DL
CREAT SPEC-SCNC: 191 MG/DL
CREAT/PROT UR: 0.2 RATIO
EOSINOPHIL # BLD AUTO: 0.09 K/UL
EOSINOPHIL NFR BLD AUTO: 1.9 %
GLUCOSE QUALITATIVE U: NEGATIVE MG/DL
GLUCOSE SERPL-MCNC: 115 MG/DL
HBA1C MFR BLD HPLC: 6.2 %
HCT VFR BLD CALC: 44.9 %
HDLC SERPL-MCNC: 41 MG/DL
HGB BLD-MCNC: 14 G/DL
IMM GRANULOCYTES NFR BLD AUTO: 0.2 %
KETONES URINE: NEGATIVE
LDLC SERPL CALC-MCNC: 96 MG/DL
LEUKOCYTE ESTERASE URINE: ABNORMAL
LYMPHOCYTES # BLD AUTO: 1.79 K/UL
LYMPHOCYTES NFR BLD AUTO: 37.4 %
MAN DIFF?: NORMAL
MCHC RBC-ENTMCNC: 27.7 PG
MCHC RBC-ENTMCNC: 31.2 GM/DL
MCV RBC AUTO: 88.7 FL
MONOCYTES # BLD AUTO: 0.51 K/UL
MONOCYTES NFR BLD AUTO: 10.7 %
NEUTROPHILS # BLD AUTO: 2.37 K/UL
NEUTROPHILS NFR BLD AUTO: 49.6 %
NITRITE URINE: NEGATIVE
PH URINE: 5.5
PLATELET # BLD AUTO: 242 K/UL
POTASSIUM SERPL-SCNC: 5 MMOL/L
PROT SERPL-MCNC: 7.8 G/DL
PROT UR-MCNC: 45 MG/DL
PROTEIN URINE: 30 MG/DL
RBC # BLD: 5.06 M/UL
RBC # FLD: 14.6 %
SODIUM SERPL-SCNC: 142 MMOL/L
SPECIFIC GRAVITY URINE: 1.02
TRIGL SERPL-MCNC: 146 MG/DL
TSH SERPL-ACNC: 1.97 UIU/ML
UROBILINOGEN URINE: NEGATIVE MG/DL
WBC # FLD AUTO: 4.78 K/UL

## 2018-09-05 RX ORDER — ERGOCALCIFEROL 1.25 MG/1
1.25 MG CAPSULE, LIQUID FILLED ORAL
Qty: 8 | Refills: 0 | Status: ACTIVE | COMMUNITY
Start: 2018-09-05 | End: 1900-01-01

## 2018-11-08 ENCOUNTER — FORM ENCOUNTER (OUTPATIENT)
Age: 78
End: 2018-11-08

## 2018-11-08 PROBLEM — K42.9 UMBILICAL HERNIA WITHOUT OBSTRUCTION OR GANGRENE: Chronic | Status: ACTIVE | Noted: 2017-06-23

## 2018-11-08 PROBLEM — J45.909 UNSPECIFIED ASTHMA, UNCOMPLICATED: Chronic | Status: ACTIVE | Noted: 2017-06-23

## 2018-11-08 PROBLEM — R33.9 RETENTION OF URINE, UNSPECIFIED: Chronic | Status: ACTIVE | Noted: 2017-05-16

## 2018-11-09 ENCOUNTER — APPOINTMENT (OUTPATIENT)
Dept: RADIOLOGY | Facility: IMAGING CENTER | Age: 78
End: 2018-11-09
Payer: MEDICARE

## 2018-11-09 ENCOUNTER — OUTPATIENT (OUTPATIENT)
Dept: OUTPATIENT SERVICES | Facility: HOSPITAL | Age: 78
LOS: 1 days | End: 2018-11-09
Payer: MEDICARE

## 2018-11-09 ENCOUNTER — APPOINTMENT (OUTPATIENT)
Dept: INTERNAL MEDICINE | Facility: CLINIC | Age: 78
End: 2018-11-09
Payer: MEDICARE

## 2018-11-09 ENCOUNTER — LABORATORY RESULT (OUTPATIENT)
Age: 78
End: 2018-11-09

## 2018-11-09 VITALS
SYSTOLIC BLOOD PRESSURE: 130 MMHG | HEART RATE: 64 BPM | OXYGEN SATURATION: 96 % | HEIGHT: 67 IN | TEMPERATURE: 97.9 F | BODY MASS INDEX: 31.08 KG/M2 | WEIGHT: 198 LBS | DIASTOLIC BLOOD PRESSURE: 80 MMHG

## 2018-11-09 DIAGNOSIS — D29.22 BENIGN NEOPLASM OF LEFT TESTIS: Chronic | ICD-10-CM
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
## 2018-11-09 DIAGNOSIS — Z95.828 PRESENCE OF OTHER VASCULAR IMPLANTS AND GRAFTS: Chronic | ICD-10-CM

## 2018-11-09 DIAGNOSIS — Z98.890 OTHER SPECIFIED POSTPROCEDURAL STATES: Chronic | ICD-10-CM

## 2018-11-09 LAB
ALBUMIN SERPL ELPH-MCNC: 4.7 G/DL
ALP BLD-CCNC: 78 U/L
ALT SERPL-CCNC: 26 U/L
AST SERPL-CCNC: 21 U/L
BILIRUB DIRECT SERPL-MCNC: 0.1 MG/DL
BILIRUB INDIRECT SERPL-MCNC: 0.3 MG/DL
BILIRUB SERPL-MCNC: 0.4 MG/DL
PROT SERPL-MCNC: 8.1 G/DL

## 2018-11-09 PROCEDURE — 73110 X-RAY EXAM OF WRIST: CPT | Mod: 26,RT

## 2018-11-09 PROCEDURE — 73110 X-RAY EXAM OF WRIST: CPT

## 2018-11-09 PROCEDURE — 71046 X-RAY EXAM CHEST 2 VIEWS: CPT | Mod: 26

## 2018-11-09 PROCEDURE — 71100 X-RAY EXAM RIBS UNI 2 VIEWS: CPT | Mod: 26

## 2018-11-09 PROCEDURE — 99213 OFFICE O/P EST LOW 20 MIN: CPT

## 2018-11-09 PROCEDURE — 71100 X-RAY EXAM RIBS UNI 2 VIEWS: CPT

## 2018-11-09 PROCEDURE — 71046 X-RAY EXAM CHEST 2 VIEWS: CPT

## 2018-11-09 NOTE — HISTORY OF PRESENT ILLNESS
[FreeTextEntry8] : \par Mechanical fall about 3 day ago (tripped on car trailer), hit the ground, caught himself with his right hand and hit his right chest.  Denies any head strike.  Denies any dyspnea, cough, bruising afterwards.  Swelling of the right wrist, improving.  Still has pain through the back and radial side of the wrist, mild pain over right ribs. \par \par Will be around [place] for about the next 3 weeks, moving to [place] (logistics of the move taking longer than had hoped).  \par

## 2018-11-09 NOTE — PHYSICAL EXAM
[No Acute Distress] : no acute distress [Well-Appearing] : well-appearing [No Respiratory Distress] : no respiratory distress  [Clear to Auscultation] : lungs were clear to auscultation bilaterally [No Accessory Muscle Use] : no accessory muscle use [Normal Rate] : normal rate  [Regular Rhythm] : with a regular rhythm [Normal S1, S2] : normal S1 and S2 [No Murmur] : no murmur heard [No Edema] : there was no peripheral edema [Soft] : abdomen soft [Non Tender] : non-tender [Normal Bowel Sounds] : normal bowel sounds [No CVA Tenderness] : no CVA  tenderness [Normal Gait] : normal gait [Coordination Grossly Intact] : coordination grossly intact [No Focal Deficits] : no focal deficits [de-identified] : noted swelling and tenderness over right wrist and tender at bottom right rib [de-identified] : no ecchymosis or lacerations over sites of injury

## 2018-11-09 NOTE — PLAN
[FreeTextEntry1] : \par #1 right wrist and right rib pain s/p fall\par Check x-rays\par Icing\par Recommend OTC brace to rest the wrist x 1-2 weeks\par

## 2019-07-15 ENCOUNTER — RX RENEWAL (OUTPATIENT)
Age: 79
End: 2019-07-15

## 2020-04-14 NOTE — ED PROVIDER NOTE - PSH
[address], [address], [address] [facility]  [address] [facility], [address] [place] [address], [address] [place] [address]   [phone]  Mayra Collins DO      Patient ID:  Name:  Agnes Rae  MRN:  [id number]  :  1945/74 y.o. Date:  2020      HISTORY OF PRESENT ILLNESS:  Agnes Rae is a 69 y.o.  postmenopausal female referred by Dr. Frantz Ghotra for pelvic mass. Has h/o back pain s/p epidural steroid injection She was seen for workup of groin pain, s/p TVUS which demonstrated a nonspecific soft tissueu mass 1.7cm in the lower uterine segment near the cervix. She c/o burning in vulva. Has seen derm in past but not on any treatment. Also has intermittent anterior thigh pain worse when standing. Pt here to review MRI. Has been using clobetasol with some improvement of pain at night    Labs:  none      Imaging:  MRI 4/3/2020  FINDINGS:     UTERUS:  Orientation: Retroverted  Size: 8.0 x 3.0 x 5.2 cm  Masses: 2 small calcified fibroids are present which correspond with the  calcifications seen on ultrasound 3/9/2020. Endometrium: Normal.   Junctional zone: Normal.  Cervix: Normal.     ADNEXA:  Unremarkable.     OTHER:  Colonic diverticulosis is present.      ========     IMPRESSION  IMPRESSION:     Cervical mass is not noted. Focal hyperechoic myometrial area seen on ultrasound  3/9/2023 corresponds with redundant myometrium related to the retroverted  uterus. 3/10/2020  TVUS  Scanned in media  IMPRESSION:  Nonspecific sort tissue 1.7cm mass present in the lower uterine segment/cervix which is abutting the endometrium. This could represent a low uterine fibroid, or a cervical mass. Tissue sampling may be indicated to r/o malignancy. Right ovary demonstrates normal arterial and venous flow. Left ovary obscured by bowel gas.        ROS:   As above      Patient Active Problem List    Diagnosis Date Noted    Severe obesity ([address] 2020     Past Medical History:   Diagnosis Date    Hypercholesterolemia     Hypertension       Past Surgical History:   Procedure Laterality Date    BREAST SURGERY PROCEDURE UNLISTED Left     lump removal    HX COLONOSCOPY        OB History        2    Para        Term                AB        Living   2       SAB        TAB        Ectopic        Molar        Multiple        Live Births                  Social History     Tobacco Use    Smoking status: Former Smoker    Smokeless tobacco: Never Used   Substance Use Topics    Alcohol use: Yes     Comment: 2 per month/social      Family History   Problem Relation Age of Onset    Cancer Maternal Grandmother         lung      Current Outpatient Medications   Medication Sig    atorvastatin (LIPITOR) 10 mg tablet TAKE 1 TABLET EVERY NIGHT AT BEDTIME    multivitamin (ONE A DAY) tablet Take 1 Tab by mouth daily.  clobetasoL (TEMOVATE) 0.05 % topical cream Apply  to affected area nightly.  ergocalciferol (ERGOCALCIFEROL) 1,250 mcg (50,000 unit) capsule Take 50,000 Units by mouth every seven (7) days. No current facility-administered medications for this visit. Allergies   Allergen Reactions    Latex Rash    Penicillins Hives          OBJECTIVE:    Physical Exam  VITAL SIGNS: Visit Vitals  /65 (BP 1 Location: Left arm, BP Patient Position: Sitting)   Pulse 68   Temp 97.1 °F (36.2 °C) (Oral)   Resp 14   Ht 5' 6\" (1.676 m)   Wt 105.7 kg (233 lb)   SpO2 99%   BMI 37.61 kg/m²      GENERAL BREANNA: in no apparent distress and well developed and well nourished   MUSCULOSKEL: no joint tenderness, deformity or swelling   INTEGUMENT:  warm and dry, no rashes or lesions   ABDOMEN . soft, NT, ND, No masses appreciated   EXTREMITIES: extremities normal, atraumatic, no cyanosis or edema   PELVIC: External genitalia: obliteration of normal anatomy with erythema c/w lichen sclerosis  Vaginal: atrophic mucosa  Cervix: normal appearance  Adnexa: normal bimanual exam  Uterus: normal single, nontender   RECTAL: deferred   ROVERTO SURVEY: Cervical, supraclavicular, axillary and inguinal nodes normal.   NEURO: Grossly normal          IMPRESSION/PLAN:  1. LIchen sclerosis   -no need for biopsy at this time   -pt info regarding vulvar hygiene given to patient   -will plan for continued use of clobetasol 3x/wk   -f/u in 6 months or if symptoms worsen    2. ? Cervical mass   -reviewed MRI with no mass just retroverted uterus         The total time spent was 25 minutes regarding this patients diagnosis of lichen sclerosis, cervical mass and >50% of this time was spent counseling and coordinating care    [address] [facility]  4/14/202012:21 PM Benign testicular tumor, left  age 62

## 2020-04-14 NOTE — PATIENT PROFILE ADULT. - BILL OF RIGHTS/ADMISSION INFORMATION PROVIDED TO:
04/14/20 1327   Final Note   Assessment Type Final Discharge Note   Anticipated Discharge Disposition long-term Nu     Patient is returning to [facility] as a senior living resident.     SW arranged stretcher transport via [facility]. Requested  time is 2:00 PM.  Requested  time does not guarantee arrival time.      Nurse can call report to [phone]; Room 1B    Nurse has been informed of above.    Concepcion Gonzales, NERY  [facility]  [id number]     Patient

## 2020-06-29 NOTE — ED PROVIDER NOTE - NSCAREINITIATED _GEN_ER
PULMONARY PROGRESS NOTES


Subjective


Laying in bed, in no respiratory distress





Patient intubated on 3/23 , s/p trach 4/6, 


remains on TS, 


episode of AMS and tachycardia yesterday


Vitals





Vital Signs








  Date Time  Temp Pulse Resp B/P (MAP) Pulse Ox O2 Delivery O2 Flow Rate FiO2


 


6/29/20 11:55     100 Tracheal Collar 8.0 


 


6/29/20 11:00  118 26 123/73 (90)    


 


6/29/20 08:00 98.0       





 98.0       








ROS:  No Nausea, No Chest Pain, No Abdominal Pain


General:  Alert, No acute distress


HEENT:  Other (nc at perrl   nose clear    neck  trach site ok   no lab  no 

thyromegaly)


Lungs:  Other (diminshed in bases)


Cardiovascular:  S1, S2


Abdomen:  Soft, Non-tender, Other (multiple ROBERT drains )


Extremities:  Other (+1 BLE edema)


Skin:  Warm, Dry


Labs





Laboratory Tests








Test


 6/27/20


17:30 6/28/20


00:30 6/28/20


06:06 6/28/20


08:15


 


Glucose (Fingerstick)


 135 mg/dL


(70-99) 121 mg/dL


(70-99) 128 mg/dL


(70-99) 





 


White Blood Count


 


 


 


 12.4 x10^3/uL


(4.0-11.0)


 


Red Blood Count


 


 


 


 3.06 x10^6/uL


(3.50-5.40)


 


Hemoglobin


 


 


 


 8.5 g/dL


(12.0-15.5)


 


Hematocrit


 


 


 


 26.0 %


(36.0-47.0)


 


Mean Corpuscular Volume    85 fL () 


 


Mean Corpuscular Hemoglobin    28 pg (25-35) 


 


Mean Corpuscular Hemoglobin


Concent 


 


 


 33 g/dL


(31-37)


 


Red Cell Distribution Width


 


 


 


 17.7 %


(11.5-14.5)


 


Platelet Count


 


 


 


 394 x10^3/uL


(140-400)


 


Neutrophils (%) (Auto)    78 % (31-73) 


 


Lymphocytes (%) (Auto)    12 % (24-48) 


 


Monocytes (%) (Auto)    8 % (0-9) 


 


Eosinophils (%) (Auto)    2 % (0-3) 


 


Basophils (%) (Auto)    0 % (0-3) 


 


Neutrophils # (Auto)


 


 


 


 9.7 x10^3/uL


(1.8-7.7)


 


Lymphocytes # (Auto)


 


 


 


 1.4 x10^3/uL


(1.0-4.8)


 


Monocytes # (Auto)


 


 


 


 1.0 x10^3/uL


(0.0-1.1)


 


Eosinophils # (Auto)


 


 


 


 0.2 x10^3/uL


(0.0-0.7)


 


Basophils # (Auto)


 


 


 


 0.0 x10^3/uL


(0.0-0.2)


 


Sodium Level


 


 


 


 137 mmol/L


(136-145)


 


Potassium Level


 


 


 


 4.5 mmol/L


(3.5-5.1)


 


Chloride Level


 


 


 


 101 mmol/L


()


 


Carbon Dioxide Level


 


 


 


 35 mmol/L


(21-32)


 


Anion Gap    1 (6-14) 


 


Blood Urea Nitrogen


 


 


 


 16 mg/dL


(7-20)


 


Creatinine


 


 


 


 0.6 mg/dL


(0.6-1.0)


 


Estimated GFR


(Cockcroft-Gault) 


 


 


 106.3 





 


Glucose Level


 


 


 


 129 mg/dL


(70-99)


 


Calcium Level


 


 


 


 10.5 mg/dL


(8.5-10.1)


 


Magnesium Level


 


 


 


 2.0 mg/dL


(1.8-2.4)


 


Test


 6/28/20


08:45 6/28/20


11:52 6/29/20


00:09 6/29/20


05:53


 


O2 Saturation 97 % (92-99)    


 


Arterial Blood pH


 7.41


(7.35-7.45) 


 


 





 


Arterial Blood pCO2 at


Patient Temp 55 mmHg


(35-46) 


 


 





 


Arterial Blood pO2 at Patient


Temp 108 mmHg


() 


 


 





 


Arterial Blood HCO3


 34 mmol/L


(21-28) 


 


 





 


Arterial Blood Base Excess


 8 mmol/L


(-3-3) 


 


 





 


FiO2 33    


 


Glucose (Fingerstick)


 


 128 mg/dL


(70-99) 123 mg/dL


(70-99) 125 mg/dL


(70-99)


 


Test


 6/29/20


06:00 6/29/20


11:51 


 





 


Sodium Level


 137 mmol/L


(136-145) 


 


 





 


Potassium Level


 4.4 mmol/L


(3.5-5.1) 


 


 





 


Chloride Level


 101 mmol/L


() 


 


 





 


Carbon Dioxide Level


 35 mmol/L


(21-32) 


 


 





 


Anion Gap 1 (6-14)    


 


Blood Urea Nitrogen


 16 mg/dL


(7-20) 


 


 





 


Creatinine


 0.7 mg/dL


(0.6-1.0) 


 


 





 


Estimated GFR


(Cockcroft-Gault) 88.9 


 


 


 





 


Glucose Level


 129 mg/dL


(70-99) 


 


 





 


Calcium Level


 10.7 mg/dL


(8.5-10.1) 


 


 





 


Glucose (Fingerstick)


 


 125 mg/dL


(70-99) 


 











Laboratory Tests








Test


 6/29/20


00:09 6/29/20


05:53 6/29/20


06:00 6/29/20


11:51


 


Glucose (Fingerstick)


 123 mg/dL


(70-99) 125 mg/dL


(70-99) 


 125 mg/dL


(70-99)


 


Sodium Level


 


 


 137 mmol/L


(136-145) 





 


Potassium Level


 


 


 4.4 mmol/L


(3.5-5.1) 





 


Chloride Level


 


 


 101 mmol/L


() 





 


Carbon Dioxide Level


 


 


 35 mmol/L


(21-32) 





 


Anion Gap   1 (6-14)  


 


Blood Urea Nitrogen


 


 


 16 mg/dL


(7-20) 





 


Creatinine


 


 


 0.7 mg/dL


(0.6-1.0) 





 


Estimated GFR


(Cockcroft-Gault) 


 


 88.9 


 





 


Glucose Level


 


 


 129 mg/dL


(70-99) 





 


Calcium Level


 


 


 10.7 mg/dL


(8.5-10.1) 











Medications





Active Scripts








 Medications  Dose


 Route/Sig


 Max Daily Dose Days Date Category


 


 Bisoprolol


 Fumarate 5 Mg


 Tablet  10 Mg


 PO DAILY


   3/16/20 Reported








Comments


 CXR 6/28/20


IMPRESSION:


No significant interval change compared to 6/25/2020.


 











ct abdomen /pelvis 6/6


1. Removal of the percutaneous pigtail drainage catheters since the prior 


exam. Sequela of pancreatitis with extensive pseudocysts again 


demonstrated, the right-sided collections are slightly larger since the 


prior exam, the left-sided collections are stable. See above.


2. Moderate to large left pleural effusion with atelectasis and collapse 


of most of the left lower lobe, stable. Small right pleural effusion is 


stable.


3. Gallstone.





ct chest 6/15 reviewed








 GRAM NEG COCCOBACILLI:MANY


        SQUAMOUS EPI CELL:RARE


        PMN (WBCs):[id number]


        Unless otherwise specified, Testing Performed by:


        [address] [address]


        For Inquires, the Physician may contact the Microbiology


        department at [phone]





  RESPIRATORY CULTURE  Final  


        Final





        MANY GRAM NEGATIVE RODS on 06/15/20 at 1107


        FINAL ID= [PSEUDOMONAS AERUGINOSA]


        MICRO CHARGES


        PSEUDOMONAS AERUGINOSA





  ANTIMICROBIAL SUSCEPTIBILITY  Final  


        Comment





        NEG ANSON 58


        PSEUDOMONAS AERUGINOSA


        ANTIBIOTIC                        RESULT          INTERPRETATION


        AMIKACIN                          <=16                  S


        AZTREONAM                         <=4                   S


        CEFTAZIDIME                       <=1                   S


        CIPROFLOXACIN                     <=0.25                S


        CEFEPIME                          <=2                   S


        CEFTAZIDIME/AVIBACTAM             <=4                   S


        GENTAMICIN                        <=2                   S


        LEVOFLOXACIN                      <=0.5                 S





Impression


.


IMPRESSION:


1.  Acute hypoxemic respiratory failure secondary to ARDS status post trach, 

developed anemia 6/7, blood drainage from RLQ abdomen drain site, and 

surrounding firmness  / developed septic shock 6/7 from abdomen source, required

levo 6/7


s/p 3 new drains 6/7 with brown color drainage,  on/off vent , on TS now


2.  Gallstone pancreatitis, now with ongoing bleeding from prior drain. Anemic. 

s/p Tx multiple units over several days


3.  septic shock/sepsis, recurrent 6/7, source abdomen. , off levo now, 


4.  Acute kidney injury-, Off HD--renal function decling. suspect JED on CKD due

to hypotension , improved now


5.  Acute gallstone pancreatitis.


6.  Hypoalbuminemia.


7.  Moderate persistent effusions, s/p left thora  5/12, reaccumulation of left 

effusion. O2 requirement not changed. 


8.  Fever- ,hypotension. suspect recurrent sepsis/ likely pancreatic source.  

Per ID, per surgery--


9.  Chronic anemia-- ongoing / s/p PRBC


10. Covid 19 testing negative


11. Moderate to large ascites-S/P paracentisis


12.S/P paracentisis with 4 liters removed on 4/15/20


13. S/P IR drain placement on 5/8/2020, removal, re inserted 6/7


14. Depression/Anxiety 


15. Increase effusion, ? loculated/ s/p chest tube.. drainage slowing down. 200 

cc /24 hr





Plan


.


Surgery to undergo surgery sometime this, respiratory status is currently 

compensated off mechanical ventilation


1. TS as tolerated  titrate fio2 to keep sat 94%, off  vent 


2. Follow all cultures/ PSA from pelvic drains. Abx per ID/  thoracentesis 

result transudate, await c/s,


3. Follow surgery recs-- Acute pancreatitis with persistent necrosis ---- 4/27 

status post ROBERT drain placement + C paropsilosis; 5/6 + yeast & high amylase; s/p

additional drains on 5/8, removal of drains and now increase pseudocyst and 

increase fluid collection. likely infected fluid/ sepsis. on BS abx.follow 

surgery rec, planning surgical intervention Tuesday 


4. Follow ID recs for ABX---- currently monitoring off ABX 


5. Follow nephrology recs -----


6. Continue TPN 


7. monitor HGB,  4 units since 6/6--monitor HGB transfuse if less than 8.5 


8 s/p  thoracentesis, 5/12, 3 litres removed, s/p ct on 6/15. ct drainage, 180 

cc over the past 24 hrs,  suction -40, am cxr, flushed chest tube 6/22


9. Pt remains critically ill from severe necrotic pancreatis. Even with surgery 

prognosis grim. Surgery informed family.


10. lasix/albumin prn


11.  sputum gram neg cocobacilli. pan sensitive--per ID


12. Abdominal fluid culture 6 /7 MDRO Pseudomonas, yeast-- per ID





     


DVT/GI PPX: protonix---


PT/OT


D/W RN, RT





CC time 30 minutes











STEVE MIRANDA MD              Jun 29, 2020 12:20 Yamila Sheridan)

## 2021-05-09 NOTE — H&P PST ADULT - OTHER CARE PROVIDERS
Dr Camargo Pulmonary   Dr Johnston Vascular MD Joseph:  patient seen and evaluated with the resident.  I was present for key portions of the History & Physical, and I agree with the Impression & Plan.  MD Joseph:  23 yo M, c/o R middle finger laceration.   Location:  longitudinal/oblique-oriented volar laceration extending from the base of MCP area distally to the middle phalanx  Associated Sx: no weakness/numbness  Better: immobilization  Quality:  throbbing  context:  jumped over chain-link fence and hand got caught on the top of fence and lacerated his hand.  Gen:  Well appearing, anxious.  Head:  NC/AT  Resp: No distress   Ext/Skin:  laceration as above, minimal edema, SITLT and symmetric, CR < 2sec, FDS/FDP intact.  Impression:  21yo M, RHD, c/o complex R middle finger laceration.  NVI without clinical evidence of tendon injury.  Tdap UTD by Hx.  Plan:  wound care, keflex ppx given complexity of laceration, sutures, strict return precautions. Dr Camargo Pulmonary   Dr Johnston [facility]

## 2021-11-29 NOTE — ED PROVIDER NOTE - ATTENDING CONTRIBUTION TO CARE
5
Attending note:   After face to face evaluation of this patient, I concur with above noted hx, pe, and care plan for this patient. +Dizziness for days with syncope twice today.   +Increased RR, slight hypoxia; evaluation in progress.

## 2022-04-05 NOTE — H&P PST ADULT - AS O2 DELIVERY
room air Suturegard Body: The suture ends were repeatedly re-tightened and re-clamped to achieve the desired tissue expansion.

## 2023-06-22 NOTE — ASU PREOP CHECKLIST - SIDE RAILS UP
CC:  Altaf Parekh is here today for:   Chief Complaint   Patient presents with   • Office Visit     lumbar follow up         Referring MD: Armani Staples MD  PCP: Santi Lang MD   Medications: medications verified and updated  Refills needed today?  NO  denies known Latex allergy or symptoms of Latex sensitivity.  Patient would like communication of their results via:      Cell Phone:   Telephone Information:   Mobile [phone]     Okay to leave a message containing results? Yes  Tobacco history: verified  There is no height or weight on file to calculate BMI.               n/a

## 2024-03-10 NOTE — PROGRESS NOTE ADULT - ASSESSMENT
[facility] Hospital Medicine Progress Note        Chief Complaint: Inpatient Follow-up for sepsis    HPI per admitting team:   Sebastian Davis is a 54 y.o. male with a PMHx of polysubstance abuse and chronic back pain  who presented to [facility] via EMS on 3/9/2024 with c/o generalized weakness and numbness to fingers and toes, slurred speech and numbness to the left side of his face upon awakening at midnight.  Patient last smoked methamphetamine  and marijuana x3 days ago.  He denied any IV drug use, fever, CP. Symptoms apparently resolved prior to arriving in the ED. NIH of 0 and resolution of symptoms therefore stroke workup not obtained initially.  However patient began experiencing these symptoms again while in the ED. He also complains of bilateral lower extremity weakness, numbness.  He also endorsed a fall x1 day ago.     Of note, patient was seen in the ED on 03/05/2024 with complaints of chronic back pain requesting pain medication also noted to have a fever and cough; CXR demonstrated confluent airspace opacities in the left infrahilar region and early infiltrate can not be excluded.  He was discharged home on Augmentin and azithromycin for pneumonia. BLOOD CULTURES X2 FROM 3/6/2024 POSITIVE FOR Streptococcus dysgalactiae ssp equisimilis.     Upon presentation to ED, vital signed included /108, , RR 18, temperature 98.3° F. Labs notable for WBC 18.15, platelets 114, sodium 129, potassium 5.2, chloride 91, CO2 11, BUN 67.5, creatinine 3.82, calcium 7.9, albumin 2.7, , , CPK 10,110.  Lactic acid elevated at 9.4 and repeat 7.6.  Acetaminophen and salicylate level undetectable.  UDS positive for amphetamines, fentanyl and cannabinoids.  Influenza, RSV and COVID-19 negative.  Urinalysis with 2+ protein, trace ketones, 3+ blood, positive nitrites, 2+ bilirubin, 4 urobilinogen, 6-10 RBCs, 21-50 WBCs and moderate bacteria.  Urine creatinine 61.9.  Blood  cultures x2 and urine culture pending. CT head without contrast negative for acute intracranial process.  CT chest abdomen and pelvis without contrast demonstrated nondistended bladder however bladder wall appears thickened which could reflect an element of cystitis; emphysematous changes in bilateral lungs more pronounced in bilateral upper lobes, 4 cm bulla seen in the anterior right upper lobe along the retrosternal region and a 4 mm ground-glass nodule in the left lower lobe; stomach is markedly fluid distended; 3rd duodenal segment is not well delineated possibility of partial bowel obstruction secondary to SMA syndrome is not excluded.  He was started on broad-spectrum IV antibiotics in ED, also received sodium bicarbonate 50 mEq, and calcium gluconate 1 g in ED.  Admitted to hospital medicine service for further medical management.     At the time of exam patient is pale, diaphoretic, tachycardic, dyspneic on supplemental oxygen.  Notified by nurse that lactic acid and CPK are up trending.  ICU nurse, ED nurse and nursing supervisor present at the bedside.  Attending MD notified.            Interval Hx:     03/10/2024 Dr. Overton-chart reviewed patient examined.  Patient admitted for sepsis with blood cultures already positive from a previous ER visit 2 days before this admission.  Patient was found to have Streptococcus dysgalactiae of the ER visit 3/6/24.  On this admission 03/09/2024 patient was admitted and placed on vancomycin and Zosyn.  Also he had findings of rhabdomyolysis/OKSANA/hypotensive/electrolyte abnormalities.  Patient was fluid resuscitated and vital signs much improved.  He had issues with hypoglycemia in the presence of sepsis that resolved with glucagon and changing fluids to dextrose.  Patient has an anion gap metabolic acidosis with findings of elevated lactic acid/very low CO2/rhabdo and we decided to place him on a sodium bicarb drip.  Overall he is improved.  I reviewed his CT of abdomen  75 yo M h/o prostate CA, PE, HTN, asthma, IVC filter, POD #1, TURP since he remains very tender and have decided to place him NPO secondary to fluid-filled stomach.  We will DC vancomycin and continue Zosyn.  We will continue to replace electrolytes.  His platelets have dropped to around 26,000. We will DC Lovenox workup thrombocytopenia to exclude any causes or than sepsis                Case was discussed with patient's nurse and  on the floor.  Objective/physical exam:  General: complains of abd pain   Chest: Clear to auscultation bilaterally  Heart: tachycardic at 108, +S1, S2, no appreciable murmur  Abdomen: voluntary guarding with generalized tenderness on palpation, Bs+  MSK: Warm, no lower extremity edema, no clubbing or cyanosis  Neurologic: Alert and oriented x4, Cranial nerve II-XII intact, Strength 5/5 in all 4 extremities    VITAL SIGNS: 24 HRS MIN & MAX LAST   Temp  Min: 97.4 °F (36.3 °C)  Max: 98.3 °F (36.8 °C) 97.8 °F (36.6 °C)   BP  Min: 101/69  Max: 141/89 101/69   Pulse  Min: 100  Max: 108  107   Resp  Min: 18  Max: 40 18   SpO2  Min: 92 %  Max: 95 % (!) 94 %     I have reviewed the following labs:  Recent Labs   Lab 03/06/24  0118 03/06/24  0118 03/09/24  0129 03/09/24  0844 03/09/24  1804 03/10/24  0547   WBC 15.99*   < > 18.15  18.15* 8.33  8.33 6.68  6.68 6.33  6.33   RBC 4.93  --  5.26 5.29 4.93 4.68*   HGB 15.2  --  15.9 16.0 15.0 14.1   HCT 45.0  --  46.7 46.4 42.8 39.8*   MCV 91.3  --  88.8 87.7 86.8 85.0   MCH 30.8  --  30.2 30.2 30.4 30.1   MCHC 33.8  --  34.0 34.5 35.0 35.4   RDW 13.9  --  14.3 14.4 14.1 14.0     --  114* 94* 66* 47*   MPV 11.4*  --  12.8* 12.7*  --   --     < > = values in this interval not displayed.     Recent Labs   Lab 03/09/24  0129 03/09/24  0410 03/09/24  0844 03/09/24  1804 03/10/24  0547   *  --  128* 125* 127*   K 5.2*  --  4.3 3.8 3.9   CO2 11*  --  14* 17* 20*   BUN 67.5*  --  69.0* 71.0* 77.5*   CREATININE 3.82*  --  3.22* 3.00* 2.64*   CALCIUM 7.9*  --  7.2* 6.9* 6.2*   PH  --  7.420  --   --    --    MG 2.60  --   --  2.00  --    ALBUMIN 2.7*  --  2.2* 1.9* 1.8*   ALKPHOS 80  --  66 54 45   *  --  584* 654* 586*   *  --  1,040* 1,140* 863*   BILITOT 1.4  --  1.3 1.3 1.6*     Microbiology Results (last 7 days)       Procedure Component Value Units Date/Time    Stool Culture [[id number]]     Order Status: Sent Specimen: Stool     Blood culture #2 **CANNOT BE ORDERED STAT** [[id number]]  (Abnormal) Collected: 03/09/24 0129    Order Status: Completed Specimen: Blood Updated: 03/10/24 1058     CULTURE, BLOOD ([facility]) Susceptibility To Follow      Streptococcus dysgalactiae ssp equisimilis     GRAM STAIN 2 of 2 bottles positive      Gram Positive Cocci, probable Streptococcus      Seen in gram stain of broth only    Blood culture #1 **CANNOT BE ORDERED STAT** [[id number]]  (Abnormal) Collected: 03/09/24 0129    Order Status: Completed Specimen: Blood Updated: 03/10/24 1049     CULTURE, BLOOD ([facility]) Susceptibility To Follow      Streptococcus dysgalactiae ssp equisimilis     GRAM STAIN 1 of 1 Aerobic bottle positive      Gram Positive Cocci, probable Streptococcus      Seen in gram stain of broth only    Urine culture [[id number]] Collected: 03/09/24 0217    Order Status: Completed Specimen: Urine Updated: 03/10/24 0700     Urine Culture No Growth At 24 Hours    BCID2 Panel [[id number]]  (Abnormal) Collected: 03/09/24 0129    Order Status: Completed Specimen: Blood Updated: 03/09/24 1705     CTX-M (ESBL ) N/A     IMP (Cabapenemase ) N/A     KPC resistance gene (Carbapenemase ) N/A     mcr-1 N/A     mecA ID N/A     Comment: Note: Antimicrobial resistance can occur via multiple mechanisms. A Not Detected result for antimicrobial resistance gene(s) does not indicate antimicrobial susceptibility. Subculturing is required for species identification and susceptibility testing of   isolates.        mecA/C and MREJ (MRSA) gene N/A     NDM (Carbapenemase ) N/A     OXA-48-like  (Carbapenemase ) N/A     Isela/B (VRE gene) N/A     VIM (Carbapenemase ) N/A     Enterococcus faecalis Not Detected     Enterococcus faecium Not Detected     Listeria monocytogenes Not Detected     Staphylococcus spp. Not Detected     Staphylococcus aureus Not Detected     Staphylococcus epidermidis Not Detected     Staphylococcus lugdunensis Not Detected     Streptococcus spp. Detected     Streptococcus agalactiae (Group B) Not Detected     Streptococcus pneumoniae Not Detected     Streptococcus pyogenes (Group A) Not Detected     Acinetobacter calcoaceticus/baumannii complex Not Detected     Bacteroides fragilis Not Detected     Enterobacterales Not Detected     Enterobacter cloacae complex Not Detected     Escherichia coli Not Detected     Klebsiella aerogenes Not Detected     Klebsiella oxytoca Not Detected     Klebsiella pneumoniae group Not Detected     Proteus spp. Not Detected     Salmonella spp. Not Detected     Serratia marcescens Not Detected     Haemophilus influenzae Not Detected     Neisseria meningitidis Not Detected     Pseudomonas aeruginosa Not Detected     Stenotrophomonas maltophilia Not Detected     Candida albicans Not Detected     Candida auris Not Detected     Candida glabrata Not Detected     Candida krusei Not Detected     Candida parapsilosis Not Detected     Candida tropicalis Not Detected     Cryptococcus neoformans/gattii Not Detected    Narrative:      The [employer] BCID2 Panel is a multiplexed nucleic acid test intended for the use with [address] [employer]® 2.0 or [address] [employer]® [employer] for the simultaneous qualitative detection and identification of multiple bacterial and yeast nucleic acids and select genetic determinants associated with antimicrobial resistance.  The [employer] BCID2 Panel test is performed directly on blood culture samples identified as positive by a continuous monitoring blood culture system.  Results are intended to be interpreted in  conjunction with Gram stain results.             See below for Radiology    Scheduled Med:   calcium gluconate 1 g  1 g Intravenous Once    nystatin  500,000 Units Oral QID    piperacillin-tazobactam (Zosyn) IV (PEDS and ADULTS) (extended infusion is not appropriate)  4.5 g Intravenous Q8H      Continuous Infusions:   dextrose 5 % and 0.45 % NaCl 1,000 mL with sodium bicarbonate 100 mEq infusion 125 mL/hr at 03/10/24 1413      PRN Meds:  acetaminophen, aluminum-magnesium hydroxide-simethicone, glucagon (human recombinant), levalbuterol, melatonin, ondansetron, polyethylene glycol, prochlorperazine, senna-docusate 8.6-50 mg, sodium chloride 0.9%, Pharmacy to dose Vancomycin consult **AND** vancomycin - pharmacy to dose     Assessment/Plan:  Severe sepsis secondary to Streptococcus dysgalactiae ssp equisimilis   - zosyn    Acute hypoxemic respiratory failure    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 by ct abd  - keep npo secondary to fluid filled stomach  - get kub    OKSANA   - improving   - no obstruction seen on non contrast ct abdomen   - retroperitoneal US    Lactic acidosis     Rhabdomyolysis  - continue sodium bicarbonate drip   -Daily ck    Transaminitis  -improving    Leukocytosis  - resolved    Thrombocytopenia  -worsening  - dc lovenox  -peripheral smear/folic acid/ retic count     Hyponatremia/Hypochloremia   - iv fluids changed    Hyperkalemia  - resolved    Intractable hypoglycemia  - post glucagon  - iv fluids change to D5 1/2 ss plus 2 amps sodium bicarbonate    - continue to monitor    Abd pain   -initially seen by surgical hospitalist  - will keep npo   -Ct abd reviewed and pt has fluid filled stomach  - kub today     Hypocalcemia after correction   - calcium gluconate x one     Polysubstance abuse-UDS positive for amphetamine, fentanyl, cannabinoids  Tobacco abuse   Chronic back pain  4 mm ground-glass left upper lobe nodule     Plan:   As above   Dc vanco  continue  zosyn  Npo  Kub  Peripheral smear/folic acid/ ldh/retic count  Ck for am   ID consult to follow  TTE w poor windows  Am labs   Bicarbonate drip changed to D5 1/2 ss to help w hypoglycemia   Bladder scan   Retroperitoneal us   Dc lovenox and start scd      Cc time 45 minutes   Cc dx- severe sepsis / sodium bicarbonate drip      VTE prophylaxis:     Patient condition:  Stable/Fair/Guarded/ Serious/ Critical    Anticipated discharge and Disposition:         All diagnosis and differential diagnosis have been reviewed; assessment and plan has been documented; I have personally reviewed the labs and test results that are presently available; I have reviewed the patients medication list; I have reviewed the consulting providers response and recommendations. I have reviewed or attempted to review medical records based upon their availability    All of the patient's questions have been  addressed and answered. Patient's is agreeable to the above stated plan. I will continue to monitor closely and make adjustments to medical management as needed.  _____________________________________________________________________    Nutrition Status:    Radiology:  I have personally reviewed the following imaging and agree with the radiologist.     CT Chest Abdomen Pelvis Without Contrast (XPD)  Narrative: EXAMINATION:  CT CHEST ABDOMEN PELVIS WITHOUT CONTRAST(XPD)    CLINICAL HISTORY:  Sepsis.    TECHNIQUE:  Axial CT images of the chest were obtained without intravenous contrast.  Coronal and sagittal reformations were obtained.  Axial CT images of the abdomen and pelvis were obtained without intravenous contrast.  Coronal and sagittal reformations were obtained. Automated exposure control was utilized. Total exam DLP is 282 mGy cm.    COMPARISON:  CT chest 09/05/2021, CT abdomen and pelvis 10/22/2022    FINDINGS:  CHEST:    Absence of intravenous contrast limits evaluation.  Atherosclerotic calcifications are noted.  Thoracic aorta is  not aneurysmal.  There is trace pericardial fluid.  There is no lymphadenopathy appreciated.  There is no pleural effusion demonstrated.  Centrilobular emphysematous changes are present.  There is a 5 mm ground-glass nodule within the left upper lobe image 34 of series 4.  There is dependent atelectasis.  There is no consolidation or pneumothorax identified.  No acute displaced fractures identified.    ABDOMEN AND PELVIS:    Absence of intravenous contrast limits evaluation.  There is markedly distended stomach which is fluid-filled.  The non contrasted liver, gallbladder, pancreas, spleen, and adrenal glands appear grossly unremarkable.  Kidneys demonstrate no hydronephrosis or nephrolithiasis.  Atherosclerotic calcifications are noted.  Abdominal aorta is not aneurysmal.  There is moderate colonic stool.  There is ample colonic gas present.  There is no evidence of acute appendicitis identified.  Prostate is enlarged.  Coarse calcifications are noted within.  Urinary bladder demonstrates mild wall thickening likely related to under distension.  Differential would include cystitis or chronic hypertrophic changes related to enlarged prostate.  No intraperitoneal free air or free fluid appreciated.  No definite lymphadenopathy identified.  No acute displaced fracture noted.  Impression: 1. No acute abnormality identified within the chest.  2. Emphysematous changes are present.  3. Urinary bladder wall thickening may reflect under distension, cystitis, or chronic hypertrophic changes related to prostatomegaly.  4. Fluid-filled distended stomach.  5. Moderate colonic stool with ample colonic gas.  6. [employer] concordance.    Electronically signed by: Bobby Maguire MD  Date:    03/09/2024  Time:    10:58  X-Ray Hand 3 view Left  Narrative: EXAMINATION:  Three views left hand    CLINICAL HISTORY:  Fall    COMPARISON:  01/01/2021    FINDINGS:  Punctate radiodense foreign body near the little finger distal tuft is  unchanged.  No acute fracture or dislocation.  Impression: No acute osseous findings    Electronically signed by: Chevy Santillan MD  Date:    03/09/2024  Time:    10:29  X-Ray Hand 3 view Right  Narrative: EXAMINATION:  Three views right hand    CLINICAL HISTORY:  Fall    COMPARISON:  06/09/2015    FINDINGS:  No displaced fracture or dislocation identified.  No radiopaque foreign body.  Impression: No displaced fracture    Electronically signed by: Chevy Santillan MD  Date:    03/09/2024  Time:    10:28  X-Ray Chest AP Portable  Narrative: EXAMINATION:  XR CHEST AP PORTABLE    CLINICAL HISTORY:  Respiratory failure, unspecified, unspecified whether with hypoxia or hypercapnia    COMPARISON:  03/05/2024    FINDINGS:  Single view of the chest shows improved central aeration compared to the prior study without focal consolidation, pneumothorax or pleural effusion.  Cardiac silhouette and pulmonary vasculature are normal.  Impression: No acute findings in the chest    Electronically signed by: Chevy Santillan MD  Date:    03/09/2024  Time:    10:27  CT Cervical Spine Without Contrast  EXAMINATION  CT CERVICAL SPINE WITHOUT CONTRAST    CLINICAL HISTORY  Neck pain, chronic;    TECHNIQUE  Non-contrast helical-acquisition CT images of the cervical spine were obtained and multiplanar reformats accomplished by a CT technologist at a separate workstation, pushed to PACS for physician review.    COMPARISON  22 December 2022    FINDINGS  Images were reviewed in bone, soft tissue, and lung windows.    Exam quality: adequate for evaluation    Visualized levels: Skull base through T2 upper endplate.    Vertebral segments: No displaced fracture visualized. No acute compression deformity or focal vertebral body abnormality. The C1 lateral masses are symmetrically aligned on C2.  No evidence of traumatic subluxation. There are similar degenerative endplate changes and associated osteophytic projections, as well as multilevel bilateral  facet arthrosis.  Chronic/degenerative grade 1 anterolisthesis of C3 on C4 and C7 on T1 also unchanged.    Disc spaces: Multilevel intervertebral narrowing is present. No acute process identified.    Curvature: No grossly abnormal curvature appreciated.    Paravertebral tissue/musculature: No thickening or focal contour irregularity. The paraspinal musculature and overlying subcutaneous tissues demonstrate no acute or focal lesion.    Other findings: The visualized thyroid tissue is unremarkable. Scattered vascular calcifications are present. The included upper lung zones and mediastinal vasculature are without focal abnormality. No acute or suspicious focal abnormality of the included brain and skull base.    IMPRESSION  1. No evidence of acute osseous displacement or traumatic malalignment.  2. Degenerative changes and other nonacute secondary details discussed above, similar in the interval.  ==========    Please note ligament, spinal cord, and/or vascular abnormalities cannot be excluded on the basis of this examination.  Additional imaging recommended if there is elevated clinical concern for high-grade soft tissue injury.    RADIATION DOSE  Automated tube current modulation, weight-based exposure dosing, and/or iterative reconstruction technique utilized to reach lowest reasonably achievable exposure rate.    DLP: 407 mGy*cm    Electronically signed by: Natanael Pereira  Date:    03/09/2024  Time:    10:26  CT Head Without Contrast  Narrative: EXAMINATION:  CT HEAD WITHOUT CONTRAST    CLINICAL HISTORY:  Slurred speech, left facial numbness.    TECHNIQUE:  Axial CT images were obtained of the brain without intravenous contrast.  Coronal and sagittal reformations were obtained.  Automated exposure control utilized to reduce radiation dose.  Total exam DLP is 975 mGy cm.    COMPARISON:  08/28/2012    FINDINGS:  Gray-white matter differentiation is within normal limits. There is chronic involutional change.  There is  chronic white matter microischemic change.  There is minimal intracranial atherosclerosis.  No acute intracranial hemorrhage, extra-axial fluid collection, hydrocephalus, mass effect, or midline shift is noted.  No large vessel territory acute ischemia is identified.  Visualized paranasal sinuses are clear.  Visualized mastoid air cells are sclerotic bilaterally could reflect sequela of chronic mastoiditis change.  No acute displaced calvarial fracture is identified.  Impression: 1. No acute intracranial abnormalities identified.  2. [employer] concordance.    Electronically signed by: Bobby Maguire MD  Date:    03/09/2024  Time:    09:56      Oniel Overton MD  Department of Hospital Medicine   [facility]   03/10/2024

## 2024-04-18 NOTE — H&P PST ADULT - NEGATIVE GENERAL GENITOURINARY SYMPTOMS
Patient returning call and scheduled per message below.    FYI - Encounter Closed    no hematuria/no renal colic

## 2024-10-04 NOTE — PATIENT PROFILE ADULT. - NS PRO PT REFERRAL QUES 4 YN
History and Physical    Pt Name: Berny Hoyos  MRN: [id number]  YOB: 1981  Date of evaluation: 10/4/2024    SUBJECTIVE:   History of Chief Complaint:    Patient presents for PAT appointment.  He reports neck pain, RUE symptoms (pain, numbness/tingling).  He says that symptoms began several months ago and have persisted.  He has not been able to work as a result.  He has failed conservative treatment.  He has been scheduled for C6-C7 ARTHROPLASTY.   Past Medical History    has a past medical history of Cervical spondylolysis, Chronic infection of both ears, Class 3 severe obesity due to excess calories in adult, COVID, Diverticulitis, Dyslipidemia, Hypertension, Lumbar strain, Prediabetes, Snores, Under care of team, and Under care of team.  Past Surgical History   has a past surgical history that includes Appendectomy; Tympanostomy tube placement; and [place] tooth extraction.  Medications  Prior to Admission medications    Medication Sig Start Date End Date Taking? Authorizing Provider   metFORMIN (GLUCOPHAGE) 500 MG tablet Take 1 tablet daily with supper for 7 days, THEN 1 tablet 2 times daily (with meals) for 7 days, THEN 1 tablet with breakfast and 2 tablets with supper for 7 days, THEN 2 tablets daily (with meals). 9/26/24  Yes Richelle Burton MD   cyclobenzaprine (FLEXERIL) 10 MG tablet Take 1 tablet by mouth 3 times daily as needed for Muscle spasms 9/12/24 10/12/24 Yes Tigre Albright MD   gabapentin (NEURONTIN) 400 MG capsule Take 1 capsule by mouth 3 times daily for 90 days. Intended supply: 30 days 9/12/24 12/11/24 Yes Tigre Albright MD   metoprolol tartrate (LOPRESSOR) 50 MG tablet Take 1 tablet by mouth 2 times daily 9/9/24  Yes Tigre Albright MD   hydroCHLOROthiazide (HYDRODIURIL) 25 MG tablet Take 1 tablet by mouth every morning 5/20/24  Yes Tigre Albright MD     Allergies  is allergic to amlodipine, calcium channel blockers, and lisinopril.  Family History  family history includes Heart Attack  no

## 2024-10-15 NOTE — H&P PST ADULT - MALLAMPATI CLASS
Other (Free Text): May have been a milium Render Risk Assessment In Note?: no Note Text (......Xxx Chief Complaint.): This diagnosis correlates with the Detail Level: Zone Class II - visualization of the soft palate, fauces, and uvula
